# Patient Record
Sex: MALE | Race: WHITE | NOT HISPANIC OR LATINO | Employment: OTHER | URBAN - METROPOLITAN AREA
[De-identification: names, ages, dates, MRNs, and addresses within clinical notes are randomized per-mention and may not be internally consistent; named-entity substitution may affect disease eponyms.]

---

## 2017-06-22 PROBLEM — E78.5 HYPERLIPIDEMIA: Status: ACTIVE | Noted: 2017-06-22

## 2018-05-07 ENCOUNTER — OFFICE VISIT (OUTPATIENT)
Dept: MEDICAL GROUP | Facility: PHYSICIAN GROUP | Age: 63
End: 2018-05-07
Payer: COMMERCIAL

## 2018-05-07 VITALS
DIASTOLIC BLOOD PRESSURE: 62 MMHG | HEIGHT: 73 IN | RESPIRATION RATE: 19 BRPM | HEART RATE: 57 BPM | OXYGEN SATURATION: 96 % | WEIGHT: 226.6 LBS | BODY MASS INDEX: 30.03 KG/M2 | SYSTOLIC BLOOD PRESSURE: 100 MMHG | TEMPERATURE: 97.6 F

## 2018-05-07 DIAGNOSIS — E78.2 MIXED HYPERLIPIDEMIA: ICD-10-CM

## 2018-05-07 DIAGNOSIS — D68.59 HYPERCOAGULABLE STATE (HCC): ICD-10-CM

## 2018-05-07 DIAGNOSIS — G40.909 SEIZURE DISORDER (HCC): ICD-10-CM

## 2018-05-07 DIAGNOSIS — E66.3 OVERWEIGHT (BMI 25.0-29.9): ICD-10-CM

## 2018-05-07 DIAGNOSIS — I82.4Z2 DEEP VEIN THROMBOSIS (DVT) OF DISTAL VEIN OF LEFT LOWER EXTREMITY, UNSPECIFIED CHRONICITY (HCC): ICD-10-CM

## 2018-05-07 PROCEDURE — 99204 OFFICE O/P NEW MOD 45 MIN: CPT | Performed by: FAMILY MEDICINE

## 2018-05-07 RX ORDER — WARFARIN SODIUM 2.5 MG/1
2.5 TABLET ORAL DAILY
Qty: 24 TAB | Refills: 0 | Status: SHIPPED | OUTPATIENT
Start: 2018-05-07 | End: 2018-09-24

## 2018-05-07 RX ORDER — WARFARIN SODIUM 10 MG/1
10 TABLET ORAL DAILY
COMMUNITY
End: 2018-05-07 | Stop reason: SDUPTHER

## 2018-05-07 RX ORDER — WARFARIN SODIUM 2.5 MG/1
2.5 TABLET ORAL DAILY
COMMUNITY
End: 2018-05-07 | Stop reason: SDUPTHER

## 2018-05-07 RX ORDER — WARFARIN SODIUM 10 MG/1
10 TABLET ORAL DAILY
Qty: 90 TAB | Refills: 0 | Status: SHIPPED | OUTPATIENT
Start: 2018-05-07 | End: 2018-07-11 | Stop reason: SDUPTHER

## 2018-05-07 NOTE — PROGRESS NOTES
CC:  Hypercoagulability     HISTORY OF THE PRESENT ILLNESS: Patient is a 62 y.o. male. This pleasant patient is here today to establish care after his doctor retired.    Health Maintenance:       Deep vein thrombosis (DVT) of distal vein of left lower extremity (CMS-[facility])  He was diagnosed with DVT in his left leg in the setting of a pneumonia.  He was started on coumadin.  He has been on coumadin since as he was told he was hypercoagulable.      Seizure disorder (CMS-[facility])  He is currently following with Dr. Miguel.  He saw Dr. Mendez previous to that.  He is on phenytoin and phenobarbital.  He has not had a seizure for years.    Overweight (BMI 25.0-29.9)  He and his wife are losing weight together.  He has lost at least 15 pounds in the last 5 months.      Hyperlipidemia  His last cholesterol was elevated.  He has lost weight since this was done.  Tchol 100, , HDL 33.      Allergies: Codeine; Demerol; and Penicillins    Current Outpatient Prescriptions Ordered in [facility]   Medication Sig Dispense Refill   • warfarin (COUMADIN) 10 MG Tab Take 1 Tab by mouth every day. 90 Tab 0   • warfarin (COUMADIN) 2.5 MG Tab Take 1 Tab by mouth every day. Take 1 tab twice a week. 24 Tab 0   • phenytoin ER (DILANTIN) 100 MG Cap Take 100 mg by mouth 3 times a day. Pt takes 5 Caps QOD alternating with 6 Caps as Directed     • phenobarbital (LUMINAL) 64.8 MG tablet Take 64.8 mg by mouth 3 times a day.       No current [facility]-ordered facility-administered medications on file.        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     - HEENT: Negative for headaches, vision changes, hearing changes, ear pain, ear discharge, rhinorrhea, sinus congestion, sore throat, and neck pain.      - Respiratory: Negative for cough, sputum production, chest congestion, dyspnea, wheezing, and crackles.      - Cardiovascular: Negative for chest pain, palpitations, orthopnea, and bilateral lower extremity edema.     - Gastrointestinal: Negative for heartburn, nausea, vomiting, abdominal pain, hematochezia, melena, diarrhea, constipation, and greasy/foul-smelling stools.     - Genitourinary: Negative for dysuria, polyuria, hematuria, pyuria, urinary urgency, and urinary incontinence.    - Musculoskeletal: Negative for myalgias, back pain, and joint pain.     - Skin: Negative for rash, itching, cyanotic skin color change.     - Neurological: Negative for dizziness, tingling, tremors, focal sensory deficit, focal weakness and headaches.     - Endo/Heme/Allergies: Does not bruise/bleed easily.     - Psychiatric/Behavioral: Negative for depression, suicidal/homicidal ideation and memory loss.        Exam: Blood pressure 100/62, pulse (!) 57, temperature 36.4 °C (97.6 °F), resp. rate 19, height 1.854 m (6' 1\"), weight 102.8 kg (226 lb 9.6 oz), SpO2 96 %. Body mass index is 29.9 kg/m².    General: Normal appearing. No distress.  HEENT: Normocephalic. Eyes conjunctiva clear lids without ptosis, pupils equal and reactive to light accommodation, ears normal shape and contour, canals are clear bilaterally, tympanic membranes are benign, nasal mucosa benign, oropharynx is without erythema, edema or exudates.   Neck: Supple without JVD or bruit. Thyroid is not enlarged.  Pulmonary: Clear to ausculation.  " Normal effort. No rales, ronchi, or wheezing.  Cardiovascular: Regular rate and rhythm without murmur. Carotid and radial pulses are intact and equal bilaterally.  Abdomen: Soft, nontender, nondistended. Normal bowel sounds. Liver and spleen are not palpable  Neurologic: Grossly nonfocal  Lymph: No cervical, supraclavicular or axillary lymph nodes are palpable  Skin: Warm and dry.  No obvious lesions.  Musculoskeletal: Normal gait. No extremity cyanosis, clubbing, or edema.  Psych: Normal mood and affect. Alert and oriented x3. Judgment and insight is normal.    Please note that this dictation was created using voice recognition software. I have made every reasonable attempt to correct obvious errors, but I expect that there are errors of grammar and possibly content that I did not discover before finalizing the note.      Assessment/Plan  1. Deep vein thrombosis (DVT) of distal vein of left lower extremity, unspecified chronicity (HCC)  History of a DVT the left lower leg that occurred during a episode of pneumonia. He and his wife report that he had a hypercoagulability disorder and he has been on anticoagulation since. I do not have records to confirm which hypercoagulability disorder this is. We will continue anticoagulation for now and will request record.  He has been referred to the anticoagulation service.  - REFERRAL TO ANTICOAGULATION MONITORING  - warfarin (COUMADIN) 10 MG Tab; Take 1 Tab by mouth every day.  Dispense: 90 Tab; Refill: 0  - warfarin (COUMADIN) 2.5 MG Tab; Take 1 Tab by mouth every day. Take 1 tab twice a week.  Dispense: 24 Tab; Refill: 0    2. Seizure disorder (HCC)  He is well-controlled on his current antiepileptics. This is Dr. Miguel.    3. Overweight (BMI 25.0-29.9)  He has been losing weight quite successfully on a plan he is following with his wife. He is congratulated on his weight loss and for no longer being in the obesity range.    4. Hypercoagulable state (HCC)  - REFERRAL TO  ANTICOAGULATION MONITORING  - warfarin (COUMADIN) 10 MG Tab; Take 1 Tab by mouth every day.  Dispense: 90 Tab; Refill: 0  - warfarin (COUMADIN) 2.5 MG Tab; Take 1 Tab by mouth every day. Take 1 tab twice a week.  Dispense: 24 Tab; Refill: 0    5. Mixed hyperlipidemia  He does have an elevated lipid panel, his 10 year risk is about 10%. He has lost quite a bit of weight since this was done and prefers to remain off medications if possible. We will repeat his cholesterol and evaluate for improvement and reassess his risk and whether medications are indicated.  - LIPID PANEL

## 2018-05-07 NOTE — ASSESSMENT & PLAN NOTE
He and his wife are losing weight together.  He has lost at least 15 pounds in the last 5 months.

## 2018-05-07 NOTE — ASSESSMENT & PLAN NOTE
He is currently following with Dr. Miguel.  He saw Dr. Mendez previous to that.  He is on phenytoin and phenobarbital.  He has not had a seizure for years.

## 2018-05-07 NOTE — PATIENT INSTRUCTIONS
"High Cholesterol  High cholesterol is a condition in which the blood has high levels of a white, waxy, fat-like substance (cholesterol). The human body needs small amounts of cholesterol. The liver makes all the cholesterol that the body needs. Extra (excess) cholesterol comes from the food that we eat.  Cholesterol is carried from the liver by the blood through the blood vessels. If you have high cholesterol, deposits (plaques) may build up on the walls of your blood vessels (arteries). Plaques make the arteries narrower and stiffer. Cholesterol plaques increase your risk for heart attack and stroke. Work with your health care provider to keep your cholesterol levels in a healthy range.  What increases the risk?  This condition is more likely to develop in people who:  · Eat foods that are high in animal fat (saturated fat) or cholesterol.  · Are overweight.  · Are not getting enough exercise.  · Have a family history of high cholesterol.  What are the signs or symptoms?  There are no symptoms of this condition.  How is this diagnosed?  This condition may be diagnosed from the results of a blood test.  · If you are older than age 20, your health care provider may check your cholesterol every 4-6 years.  · You may be checked more often if you already have high cholesterol or other risk factors for heart disease.  The blood test for cholesterol measures:  · \"Bad\" cholesterol (LDL cholesterol). This is the main type of cholesterol that causes heart disease. The desired level for LDL is less than 100.  · \"Good\" cholesterol (HDL cholesterol). This type helps to protect against heart disease by cleaning the arteries and carrying the LDL away. The desired level for HDL is 60 or higher.  · Triglycerides. These are fats that the body can store or burn for energy. The desired number for triglycerides is lower than 150.  · Total cholesterol. This is a measure of the total amount of cholesterol in your blood, including LDL " cholesterol, HDL cholesterol, and triglycerides. A healthy number is less than 200.  How is this treated?  This condition is treated with diet changes, lifestyle changes, and medicines.  Diet changes  · This may include eating more whole grains, fruits, vegetables, nuts, and fish.  · This may also include cutting back on red meat and foods that have a lot of added sugar.  Lifestyle changes  · Changes may include getting at least 40 minutes of aerobic exercise 3 times a week. Aerobic exercises include walking, biking, and swimming. Aerobic exercise along with a healthy diet can help you maintain a healthy weight.  · Changes may also include quitting smoking.  Medicines  · Medicines are usually given if diet and lifestyle changes have failed to reduce your cholesterol to healthy levels.  · Your health care provider may prescribe a statin medicine. Statin medicines have been shown to reduce cholesterol, which can reduce the risk of heart disease.  Follow these instructions at home:  Eating and drinking  If told by your health care provider:  · Eat chicken (without skin), fish, veal, shellfish, ground turkey breast, and round or loin cuts of red meat.  · Do not eat fried foods or fatty meats, such as hot dogs and salami.  · Eat plenty of fruits, such as apples.  · Eat plenty of vegetables, such as broccoli, potatoes, and carrots.  · Eat beans, peas, and lentils.  · Eat grains such as barley, rice, couscous, and bulgur wheat.  · Eat pasta without cream sauces.  · Use skim or nonfat milk, and eat low-fat or nonfat yogurt and cheeses.  · Do not eat or drink whole milk, cream, ice cream, egg yolks, or hard cheeses.  · Do not eat stick margarine or tub margarines that contain trans fats (also called partially hydrogenated oils).  · Do not eat saturated tropical oils, such as coconut oil and palm oil.  · Do not eat cakes, cookies, crackers, or other baked goods that contain trans fats.  General instructions  · Exercise as  directed by your health care provider. Increase your activity level with activities such as gardening, walking, and taking the stairs.  · Take over-the-counter and prescription medicines only as told by your health care provider.  · Do not use any products that contain nicotine or tobacco, such as cigarettes and e-cigarettes. If you need help quitting, ask your health care provider.  · Keep all follow-up visits as told by your health care provider. This is important.  Contact a health care provider if:  · You are struggling to maintain a healthy diet or weight.  · You need help to start on an exercise program.  · You need help to stop smoking.  Get help right away if:  · You have chest pain.  · You have trouble breathing.  This information is not intended to replace advice given to you by your health care provider. Make sure you discuss any questions you have with your health care provider.  Document Released: 12/18/2006 Document Revised: 07/15/2017 Document Reviewed: 06/17/2017  [employer] Interactive Patient Education © 2017 [employer]

## 2018-05-07 NOTE — ASSESSMENT & PLAN NOTE
He was diagnosed with DVT in his left leg in the setting of a pneumonia.  He was started on coumadin.  He has been on coumadin since as he was told he was hypercoagulable.

## 2018-05-08 ENCOUNTER — TELEPHONE (OUTPATIENT)
Dept: VASCULAR LAB | Facility: MEDICAL CENTER | Age: 63
End: 2018-05-08

## 2018-05-08 NOTE — TELEPHONE ENCOUNTER
Left VM for pt regarding referral to anticoagulation. Asked pt to please call back to establish care.     Christie Gomez, JosselinD

## 2018-05-23 ENCOUNTER — ANTICOAGULATION VISIT (OUTPATIENT)
Dept: MEDICAL GROUP | Facility: PHYSICIAN GROUP | Age: 63
End: 2018-05-23
Payer: COMMERCIAL

## 2018-05-23 DIAGNOSIS — I82.5Z2 CHRONIC DEEP VEIN THROMBOSIS (DVT) OF DISTAL VEIN OF LEFT LOWER EXTREMITY (HCC): ICD-10-CM

## 2018-05-23 DIAGNOSIS — G40.909 SEIZURE DISORDER (HCC): ICD-10-CM

## 2018-05-23 DIAGNOSIS — D68.59 HYPERCOAGULABLE STATE (HCC): ICD-10-CM

## 2018-05-23 LAB — INR PPP: 3.1 (ref 2–3.5)

## 2018-05-23 PROCEDURE — 99211 OFF/OP EST MAY X REQ PHY/QHP: CPT | Performed by: FAMILY MEDICINE

## 2018-05-23 PROCEDURE — 85610 PROTHROMBIN TIME: CPT | Performed by: FAMILY MEDICINE

## 2018-06-06 ENCOUNTER — ANTICOAGULATION VISIT (OUTPATIENT)
Dept: MEDICAL GROUP | Facility: PHYSICIAN GROUP | Age: 63
End: 2018-06-06
Payer: COMMERCIAL

## 2018-06-06 VITALS — HEART RATE: 60 BPM | SYSTOLIC BLOOD PRESSURE: 90 MMHG | HEIGHT: 73 IN | DIASTOLIC BLOOD PRESSURE: 56 MMHG

## 2018-06-06 DIAGNOSIS — D68.59 HYPERCOAGULABLE STATE (HCC): ICD-10-CM

## 2018-06-06 DIAGNOSIS — I82.4Z2 ACUTE DEEP VEIN THROMBOSIS (DVT) OF DISTAL VEIN OF LEFT LOWER EXTREMITY (HCC): ICD-10-CM

## 2018-06-06 DIAGNOSIS — G40.909 SEIZURE DISORDER (HCC): ICD-10-CM

## 2018-06-06 LAB — INR PPP: 3.8 (ref 2–3.5)

## 2018-06-06 PROCEDURE — 85610 PROTHROMBIN TIME: CPT | Performed by: FAMILY MEDICINE

## 2018-06-06 PROCEDURE — 99211 OFF/OP EST MAY X REQ PHY/QHP: CPT | Performed by: FAMILY MEDICINE

## 2018-06-06 NOTE — PROGRESS NOTES
Anticoagulation Summary  As of 6/6/2018    INR goal:   2.0-3.0   TTR:   0.0 % (4 d)   Today's INR:   3.8!   Warfarin maintenance plan:   10 mg (10 mg x 1) every day   Weekly warfarin total:   70 mg   Plan last modified:   Patsy Collins PharmD (6/6/2018)   Next INR check:   6/20/2018   Target end date:   Indefinite    Indications    Deep vein thrombosis (DVT) of distal vein of left lower extremity (HCC) [I82.4Z2]  Hypercoagulable state (HCC) [D68.59]  Seizure disorder (HCC) [G40.909]             Anticoagulation Episode Summary     [facility] check location[facility]   [facility]    Preferred lab:       Send INR reminders to:       Comments:         Anticoagulation Care Providers     Provider Role Specialty Phone number    Shari Vargas M.D. Referring Family Medicine [phone]    [facility] Responsible  [phone]    Josselin IvoryD Responsible          Anticoagulation Patient Findings    History of Present Illness: follow up appointment for chronic anticoagulation with the high risk medication, warfarin for DVT.    Last INR was out of range, dosage adjusted: pt remains supra therapeutic trending upward.  Will reduce tonight's dose to 5mg and reduce weekly dose by 7%. Follow up in 2 weeks, to reduce risk of adverse events related to this high risk medication,  Warfarin.    Josselin IvoryD

## 2018-06-20 ENCOUNTER — ANTICOAGULATION VISIT (OUTPATIENT)
Dept: MEDICAL GROUP | Facility: PHYSICIAN GROUP | Age: 63
End: 2018-06-20
Payer: COMMERCIAL

## 2018-06-20 VITALS — HEART RATE: 60 BPM | HEIGHT: 73 IN | DIASTOLIC BLOOD PRESSURE: 60 MMHG | SYSTOLIC BLOOD PRESSURE: 104 MMHG

## 2018-06-20 DIAGNOSIS — D68.59 HYPERCOAGULABLE STATE (HCC): ICD-10-CM

## 2018-06-20 DIAGNOSIS — G40.909 SEIZURE DISORDER (HCC): ICD-10-CM

## 2018-06-20 LAB — INR PPP: 3.4 (ref 2–3.5)

## 2018-06-20 PROCEDURE — 85610 PROTHROMBIN TIME: CPT | Performed by: FAMILY MEDICINE

## 2018-06-20 PROCEDURE — 99211 OFF/OP EST MAY X REQ PHY/QHP: CPT | Performed by: FAMILY MEDICINE

## 2018-06-20 NOTE — PROGRESS NOTES
Anticoagulation Summary  As of 6/20/2018    INR goal:   2.0-3.0   TTR:   0.0 % (2.6 wk)   Today's INR:   3.4!   Warfarin maintenance plan:   5 mg (10 mg x 0.5) on Wed; 10 mg (10 mg x 1) all other days   Weekly warfarin total:   65 mg   Plan last modified:   Patsy Collins PharmD (6/20/2018)   Next INR check:   7/11/2018   Target end date:   Indefinite    Indications    Deep vein thrombosis (DVT) of distal vein of left lower extremity (HCC) [I82.4Z2]  Hypercoagulable state (HCC) [D68.59]  Seizure disorder (HCC) [G40.909]             Anticoagulation Episode Summary     [facility] check location[facility]   [facility]    Preferred lab:       Send INR reminders to:       Comments:         Anticoagulation Care Providers     Provider Role Specialty Phone number    Shari Vargas M.D. Referring Family Medicine [phone]    [facility] Anticoagulation Services Responsible  [phone]    Patsy Collins PharmD Responsible          Anticoagulation Patient Findings    History of Present Illness: follow up appointment for chronic anticoagulation with the high risk medication, warfarin for DVT.    Last INR was out of range, dosage adjusted: pt remains supra therapeutic today.  Will reduce weekly dose by 7%.  Follow up in 1 weeks, to reduce risk of adverse events related to this high risk medication,  Warfarin.    Patsy Collins PharmD      Pt declines vitals

## 2018-07-09 PROBLEM — R74.8 ELEVATED ALKALINE PHOSPHATASE LEVEL: Status: ACTIVE | Noted: 2018-07-09

## 2018-07-11 ENCOUNTER — ANTICOAGULATION VISIT (OUTPATIENT)
Dept: MEDICAL GROUP | Facility: PHYSICIAN GROUP | Age: 63
End: 2018-07-11
Payer: COMMERCIAL

## 2018-07-11 VITALS — SYSTOLIC BLOOD PRESSURE: 118 MMHG | HEART RATE: 60 BPM | HEIGHT: 73 IN | DIASTOLIC BLOOD PRESSURE: 62 MMHG

## 2018-07-11 DIAGNOSIS — I82.4Z2 DEEP VEIN THROMBOSIS (DVT) OF DISTAL VEIN OF LEFT LOWER EXTREMITY, UNSPECIFIED CHRONICITY (HCC): ICD-10-CM

## 2018-07-11 DIAGNOSIS — D68.59 HYPERCOAGULABLE STATE (HCC): ICD-10-CM

## 2018-07-11 DIAGNOSIS — G40.909 SEIZURE DISORDER (HCC): ICD-10-CM

## 2018-07-11 LAB — INR PPP: 2.7 (ref 2–3.5)

## 2018-07-11 PROCEDURE — 99211 OFF/OP EST MAY X REQ PHY/QHP: CPT | Performed by: FAMILY MEDICINE

## 2018-07-11 PROCEDURE — 85610 PROTHROMBIN TIME: CPT | Performed by: FAMILY MEDICINE

## 2018-07-11 RX ORDER — WARFARIN SODIUM 10 MG/1
10 TABLET ORAL DAILY
Qty: 90 TAB | Refills: 1 | Status: SHIPPED | OUTPATIENT
Start: 2018-07-11 | End: 2018-09-05

## 2018-07-11 NOTE — PROGRESS NOTES
Anticoagulation Summary  As of 7/11/2018    INR goal:   2.0-3.0   TTR:   22.9 % (1.3 mo)   Today's INR:   2.7   Warfarin maintenance plan:   5 mg (10 mg x 0.5) on Wed; 10 mg (10 mg x 1) all other days   Weekly warfarin total:   65 mg   Plan last modified:   Patsy Collins PharmD (6/20/2018)   Next INR check:   8/8/2018   Target end date:   Indefinite    Indications    Deep vein thrombosis (DVT) of distal vein of left lower extremity (HCC) [I82.4Z2]  Hypercoagulable state (HCC) [D68.59]  Seizure disorder (HCC) [G40.909]             Anticoagulation Episode Summary     [facility] check location:   [facility]    Preferred lab:       Send INR reminders to:       Comments:         Anticoagulation Care Providers     Provider Role Specialty Phone number    Shari Vargas M.D. Referring Family Medicine [phone]    [facility] Anticoagulation Services Responsible  [phone]    Josselin IvoryD Responsible          Anticoagulation Patient Findings    History of Present Illness: follow up appointment for chronic anticoagulation with the high risk medication, warfarin for DVT.    Last INR was out of range, dosage adjusted: pt is now at goal.  Will increase the interval to recheck INR.  Follow up in 4 weeks, to reduce risk of adverse events related to this high risk medication,  Warfarin.    Josselin IvoryD

## 2018-08-08 ENCOUNTER — ANTICOAGULATION VISIT (OUTPATIENT)
Dept: MEDICAL GROUP | Facility: PHYSICIAN GROUP | Age: 63
End: 2018-08-08
Payer: COMMERCIAL

## 2018-08-08 DIAGNOSIS — I82.5Z2 CHRONIC DEEP VEIN THROMBOSIS (DVT) OF DISTAL VEIN OF LEFT LOWER EXTREMITY (HCC): ICD-10-CM

## 2018-08-08 DIAGNOSIS — G40.909 SEIZURE DISORDER (HCC): ICD-10-CM

## 2018-08-08 DIAGNOSIS — D68.59 HYPERCOAGULABLE STATE (HCC): ICD-10-CM

## 2018-08-08 LAB — INR PPP: 4.3 (ref 2–3.5)

## 2018-08-08 PROCEDURE — 85610 PROTHROMBIN TIME: CPT | Performed by: FAMILY MEDICINE

## 2018-08-08 PROCEDURE — 99211 OFF/OP EST MAY X REQ PHY/QHP: CPT | Performed by: FAMILY MEDICINE

## 2018-08-08 NOTE — PROGRESS NOTES
Anticoagulation Summary  As of 8/8/2018    INR goal:   2.0-3.0   TTR:   21.2 % (2.2 mo)   Today's INR:   4.3!   Warfarin maintenance plan:   5 mg (10 mg x 0.5) on Wed; 10 mg (10 mg x 1) all other days   Weekly warfarin total:   65 mg   Plan last modified:   Patsy Collins PharmD (6/20/2018)   Next INR check:   9/5/2018   Target end date:   Indefinite    Indications    Deep vein thrombosis (DVT) of distal vein of left lower extremity (HCC) [I82.4Z2]  Hypercoagulable state (HCC) [D68.59]  Seizure disorder (HCC) [G40.909]             Anticoagulation Episode Summary     [facility]R check location:   [facility]    Preferred lab:       Send INR reminders to:       Comments:         Anticoagulation Care Providers     Provider Role Specialty Phone number    Shari Vargas M.D. Referring Family Medicine [phone]    [facility] Anticoagulation Services Responsible  [phone]    Patsy Collins PharmD Responsible          Anticoagulation Patient Findings    History of Present Illness: follow up appointment for chronic anticoagulation with the high risk medication, warfarin for DVT.    Last INR was at goal. Pt is now supra therapeutic today.  Will HOLD dose tonight, then resume current dosing regimen.   [place] decreased [place] Follow up in 4 weeks (pt request), to reduce risk of adverse events related to this high risk medication,  Warfarin.    Patsy Collins PharmD      Pt declines vitals today

## 2018-09-05 ENCOUNTER — ANTICOAGULATION VISIT (OUTPATIENT)
Dept: MEDICAL GROUP | Facility: PHYSICIAN GROUP | Age: 63
End: 2018-09-05
Payer: COMMERCIAL

## 2018-09-05 DIAGNOSIS — G40.909 SEIZURE DISORDER (HCC): ICD-10-CM

## 2018-09-05 DIAGNOSIS — I82.5Z2 CHRONIC DEEP VEIN THROMBOSIS (DVT) OF DISTAL VEIN OF LEFT LOWER EXTREMITY (HCC): ICD-10-CM

## 2018-09-05 DIAGNOSIS — D68.59 HYPERCOAGULABLE STATE (HCC): ICD-10-CM

## 2018-09-05 LAB — INR PPP: 2.9 (ref 2–3.5)

## 2018-09-05 PROCEDURE — 85610 PROTHROMBIN TIME: CPT | Performed by: FAMILY MEDICINE

## 2018-09-05 PROCEDURE — 99211 OFF/OP EST MAY X REQ PHY/QHP: CPT | Performed by: FAMILY MEDICINE

## 2018-09-05 RX ORDER — WARFARIN SODIUM 10 MG/1
TABLET ORAL
Qty: 90 TAB | Refills: 1 | Status: SHIPPED | OUTPATIENT
Start: 2018-09-05 | End: 2019-01-17 | Stop reason: SDUPTHER

## 2018-09-05 NOTE — PROGRESS NOTES
Anticoagulation Summary  As of 9/5/2018    INR goal:   2.0-3.0   TTR:   17.1 % (3.2 mo)   Today's INR:   2.9   Warfarin maintenance plan:   5 mg (10 mg x 0.5) on Wed; 10 mg (10 mg x 1) all other days   Weekly warfarin total:   65 mg   Plan last modified:   Patsy Collins PharmD (6/20/2018)   Next INR check:   10/17/2018   Target end date:   Indefinite    Indications    Deep vein thrombosis (DVT) of distal vein of left lower extremity (HCC) [I82.4Z2]  Hypercoagulable state (HCC) [D68.59]  Seizure disorder (HCC) [G40.909]             Anticoagulation Episode Summary     [facility] check location[facility]   [facility]    Preferred lab:       Send INR reminders to:       Comments:         Anticoagulation Care Providers     Provider Role Specialty Phone number    Shari Vargas M.D. Referring Family Medicine [phone]    [facility] Anticoagulation Services Responsible  [phone]    Patsy Collins PharmD Responsible          Anticoagulation Patient Findings    History of Present Illness: follow up appointment for chronic anticoagulation with the high risk medication, warfarin for DVT.    Last INR was out of range, dosage adjusted.  Pt is now at goal. Pt is to continue with current warfarin dosing regimen.  Follow up in 6 weeks, to reduce risk of adverse events related to this high risk medication,  Warfarin.    Patsy Collins PharmD      Pt declines vitals today

## 2018-09-24 ENCOUNTER — HOSPITAL ENCOUNTER (OUTPATIENT)
Dept: LAB | Facility: MEDICAL CENTER | Age: 63
End: 2018-09-24
Attending: FAMILY MEDICINE
Payer: COMMERCIAL

## 2018-09-24 ENCOUNTER — OFFICE VISIT (OUTPATIENT)
Dept: MEDICAL GROUP | Facility: PHYSICIAN GROUP | Age: 63
End: 2018-09-24
Payer: COMMERCIAL

## 2018-09-24 VITALS
BODY MASS INDEX: 28.59 KG/M2 | HEART RATE: 55 BPM | HEIGHT: 73 IN | DIASTOLIC BLOOD PRESSURE: 82 MMHG | TEMPERATURE: 97.4 F | RESPIRATION RATE: 18 BRPM | SYSTOLIC BLOOD PRESSURE: 112 MMHG | WEIGHT: 215.7 LBS | OXYGEN SATURATION: 96 %

## 2018-09-24 DIAGNOSIS — Z23 IMMUNIZATION DUE: ICD-10-CM

## 2018-09-24 DIAGNOSIS — F33.2 SEVERE EPISODE OF RECURRENT MAJOR DEPRESSIVE DISORDER, WITHOUT PSYCHOTIC FEATURES (HCC): ICD-10-CM

## 2018-09-24 DIAGNOSIS — T14.91XA SUICIDAL BEHAVIOR WITH ATTEMPTED SELF-INJURY (HCC): ICD-10-CM

## 2018-09-24 DIAGNOSIS — I10 ESSENTIAL HYPERTENSION: ICD-10-CM

## 2018-09-24 LAB — VIT B12 SERPL-MCNC: 310 PG/ML (ref 211–911)

## 2018-09-24 PROCEDURE — 82607 VITAMIN B-12: CPT

## 2018-09-24 PROCEDURE — 90471 IMMUNIZATION ADMIN: CPT | Performed by: FAMILY MEDICINE

## 2018-09-24 PROCEDURE — 36415 COLL VENOUS BLD VENIPUNCTURE: CPT

## 2018-09-24 PROCEDURE — 99213 OFFICE O/P EST LOW 20 MIN: CPT | Mod: 25 | Performed by: FAMILY MEDICINE

## 2018-09-24 PROCEDURE — 90686 IIV4 VACC NO PRSV 0.5 ML IM: CPT | Performed by: FAMILY MEDICINE

## 2018-09-24 RX ORDER — SERTRALINE HYDROCHLORIDE 25 MG/1
25 TABLET, FILM COATED ORAL DAILY
Refills: 0 | COMMUNITY
Start: 2018-09-20 | End: 2018-09-24

## 2018-09-24 RX ORDER — CLONIDINE HYDROCHLORIDE 0.1 MG/1
TABLET ORAL
Refills: 0 | COMMUNITY
Start: 2018-09-20

## 2018-09-24 ASSESSMENT — PATIENT HEALTH QUESTIONNAIRE - PHQ9
CLINICAL INTERPRETATION OF PHQ2 SCORE: 6
5. POOR APPETITE OR OVEREATING: 3 - NEARLY EVERY DAY
SUM OF ALL RESPONSES TO PHQ QUESTIONS 1-9: 24
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
## 2018-09-24 NOTE — LETTER
[facility]  Shari Vargas M.D.  [address]  [facility] [address]  Fax: [phone]   Authorization for Release/Disclosure of   Protected Health Information   Name: MAGDI SARAVIA : 1955 SSN: [id number]   Address: [facility] [address]  [facility] [address] Phone:    [phone] (home) [phone] (work)   I authorize the entity listed below to release/disclose the PHI below to:   [facility]/Shari Vargas M.D. and Shari Vargas M.D.   Provider or Entity Name:     Address   City, State, [facility]   Phone:      Fax:     Reason for request: continuity of care   Information to be released:    [  ] LAST COLONOSCOPY,  including any PATH REPORT and follow-up  [  ] LAST FIT/COLOGUARD RESULT [  ] LAST DEXA  [  ] LAST MAMMOGRAM  [  ] LAST PAP  [  ] LAST LABS [  ] RETINA EXAM REPORT  [  ] IMMUNIZATION RECORDS  [  ] Release all info      [  ] Check here and initial the line next to each item to release ALL health information INCLUDING  _____ Care and treatment for drug and / or alcohol abuse  _____ HIV testing, infection status, or AIDS  _____ Genetic Testing    DATES OF SERVICE OR TIME PERIOD TO BE DISCLOSED: _____________  I understand and acknowledge that:  * This Authorization may be revoked at any time by you in writing, except if your health information has already been used or disclosed.  * Your health information that will be used or disclosed as a result of you signing this authorization could be re-disclosed by the recipient. If this occurs, your re-disclosed health information may no longer be protected by State or Federal laws.  * You may refuse to sign this Authorization. Your refusal will not affect your ability to obtain treatment.  * This Authorization becomes effective upon signing and will  on (date) __________.      If no date is indicated, this Authorization will  one (1) year from the signature date.    Name: Magdi Saravia    Signature:   Date:          9/24/2018       PLEASE FAX REQUESTED RECORDS BACK TO: [phone]

## 2018-09-24 NOTE — PATIENT INSTRUCTIONS
Positive thoughts - gratitude journal  Exercise - an hour daily if possible  Read a chapter of Proverbs daily  Healthy diet
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
## 2018-09-24 NOTE — ASSESSMENT & PLAN NOTE
This is a new diagnosis but has been present for some time.  [facility] and has an appointment within the next 5 days.  He is also planning to attend a free weekly group session [facility].

## 2018-09-25 ENCOUNTER — TELEPHONE (OUTPATIENT)
Dept: MEDICAL GROUP | Facility: PHYSICIAN GROUP | Age: 63
End: 2018-09-25

## 2018-09-25 PROBLEM — E53.8 VITAMIN B12 DEFICIENCY: Status: ACTIVE | Noted: 2018-09-25

## 2018-09-25 NOTE — TELEPHONE ENCOUNTER
----- Message from Shari Vargas M.D. sent at 9/25/2018  1:22 PM PDT -----  Abnormal results include: low normal vitamin B12.  Please let him know his B12 is low normal and I would like him to take 1000 mcg of vitamin B12 daily until his next appointment.  THanks

## 2018-09-25 NOTE — TELEPHONE ENCOUNTER
Called and spoke with the patient informing him that his B12 was low and for him to take 1000 mcg until his next appointment. Patient expressed full understanding of what he needed to do.

## 2018-10-17 ENCOUNTER — OFFICE VISIT (OUTPATIENT)
Dept: MEDICAL GROUP | Facility: PHYSICIAN GROUP | Age: 63
End: 2018-10-17
Payer: COMMERCIAL

## 2018-10-17 ENCOUNTER — ANTICOAGULATION VISIT (OUTPATIENT)
Dept: MEDICAL GROUP | Facility: PHYSICIAN GROUP | Age: 63
End: 2018-10-17
Payer: COMMERCIAL

## 2018-10-17 VITALS
HEIGHT: 73 IN | RESPIRATION RATE: 14 BRPM | DIASTOLIC BLOOD PRESSURE: 60 MMHG | SYSTOLIC BLOOD PRESSURE: 110 MMHG | OXYGEN SATURATION: 98 % | TEMPERATURE: 98.5 F | HEART RATE: 68 BPM | WEIGHT: 221 LBS | BODY MASS INDEX: 29.29 KG/M2

## 2018-10-17 DIAGNOSIS — D68.59 HYPERCOAGULABLE STATE (HCC): ICD-10-CM

## 2018-10-17 DIAGNOSIS — I82.5Z2 CHRONIC DEEP VEIN THROMBOSIS (DVT) OF DISTAL VEIN OF LEFT LOWER EXTREMITY (HCC): ICD-10-CM

## 2018-10-17 DIAGNOSIS — R74.8 ELEVATED ALKALINE PHOSPHATASE LEVEL: ICD-10-CM

## 2018-10-17 DIAGNOSIS — E53.8 VITAMIN B12 DEFICIENCY: ICD-10-CM

## 2018-10-17 DIAGNOSIS — G40.909 SEIZURE DISORDER (HCC): ICD-10-CM

## 2018-10-17 DIAGNOSIS — E78.2 MIXED HYPERLIPIDEMIA: ICD-10-CM

## 2018-10-17 DIAGNOSIS — F43.10 PTSD (POST-TRAUMATIC STRESS DISORDER): ICD-10-CM

## 2018-10-17 DIAGNOSIS — F33.2 SEVERE EPISODE OF RECURRENT MAJOR DEPRESSIVE DISORDER, WITHOUT PSYCHOTIC FEATURES (HCC): ICD-10-CM

## 2018-10-17 PROBLEM — B19.20 HEPATITIS C: Status: ACTIVE | Noted: 2018-10-17

## 2018-10-17 PROBLEM — R76.8 HEPATITIS C ANTIBODY TEST POSITIVE: Status: ACTIVE | Noted: 2018-10-17

## 2018-10-17 PROBLEM — I10 ESSENTIAL HYPERTENSION: Status: RESOLVED | Noted: 2018-09-24 | Resolved: 2018-10-17

## 2018-10-17 LAB — INR PPP: 2.4 (ref 2–3.5)

## 2018-10-17 PROCEDURE — 85610 PROTHROMBIN TIME: CPT | Performed by: FAMILY MEDICINE

## 2018-10-17 PROCEDURE — 99214 OFFICE O/P EST MOD 30 MIN: CPT | Performed by: FAMILY MEDICINE

## 2018-10-17 RX ORDER — SERTRALINE HYDROCHLORIDE 25 MG/1
25 TABLET, FILM COATED ORAL DAILY
COMMUNITY
End: 2019-01-17

## 2018-10-17 NOTE — ASSESSMENT & PLAN NOTE
[facility] is [facility].  He continues to take Dilantin 100 mg three times a day.  He denies recent seizures, his last seizure was years ago.  He has follow up with DR. Miguel on 10/29/2018.

## 2018-10-17 NOTE — ASSESSMENT & PLAN NOTE
He has [facility], with Tracy Galdamez, he has follow up appointments almost weekly.  He is taking sertraline and clonidine and is feeling better.  He denies any suicidal thoughts and has contracted for safety with many people.

## 2018-10-17 NOTE — LETTER
[facility]  Shari Vargas M.D.  [address]  [facility] [address]  Fax: [phone]   Authorization for Release/Disclosure of   Protected Health Information   Name: MAGDI CARNEY : 1955 SSN: [id number]   Address: [address] [place] [address] [address]  [facility] [address] Phone:    [phone] (home) [phone] (work)   I authorize the entity listed below to release/disclose the PHI below to:   [facility]/Shari Vargas M.D. and Shari Vargas M.D.   Provider or Entity Name:  [facility]   Address   City, State, [facility]   Phone:      Fax:     Reason for request: continuity of care   Information to be released:    [  ] LAST COLONOSCOPY,  including any PATH REPORT and follow-up  [  ] LAST FIT/COLOGUARD RESULT [  ] LAST DEXA  [  ] LAST MAMMOGRAM  [  ] LAST PAP  [  ] LAST LABS [  ] RETINA EXAM REPORT  [  ] IMMUNIZATION RECORDS  [ X ] Release all info      [  ] Check here and initial the line next to each item to release ALL health information INCLUDING  _ X__ Care and treatment for drug and / or alcohol abuse  _____ HIV testing, infection status, or AIDS  _____ Genetic Testing    DATES OF SERVICE OR TIME PERIOD TO BE DISCLOSED: _____________  I understand and acknowledge that:  * This Authorization may be revoked at any time by you in writing, except if your health information has already been used or disclosed.  * Your health information that will be used or disclosed as a result of you signing this authorization could be re-disclosed by the recipient. If this occurs, your re-disclosed health information may no longer be protected by State or Federal laws.  * You may refuse to sign this Authorization. Your refusal will not affect your ability to obtain treatment.  * This Authorization becomes effective upon signing and will  on (date) __________.      If no date is indicated, this Authorization will  one (1) year from the signature date.    Name: Magdi  Jeanmarie Saravia    Signature:   Date:     10/17/2018       PLEASE FAX REQUESTED RECORDS BACK TO: [phone]

## 2018-10-17 NOTE — LETTER
[facility]  Shari Vargas M.D.  [address]  [facility] [address]  Fax: [phone]   Authorization for Release/Disclosure of   Protected Health Information   Name: MAGDI CARNEY : 1955 SSN: [id number]   Address: [address] [place] [address] [place]  [facility] [address] Phone:    [phone] (home) [phone] (work)   I authorize the entity listed below to release/disclose the PHI below to:   [facility]/Shari Vargas M.D. and Shari Vargas M.D.   Provider or Entity Name:  [facility]   Address   City, [facility], [facility]   Phone:      Fax:[phone]     Reason for request: continuity of care   Information to be released:    [ X ] LAST COLONOSCOPY,  including any PATH REPORT and follow-up  [  ] LAST FIT/COLOGUARD RESULT [  ] LAST DEXA  [  ] LAST MAMMOGRAM  [  ] LAST PAP  [  ] LAST LABS [  ] RETINA EXAM REPORT  [  ] IMMUNIZATION RECORDS  [  ] Release all info      [  ] Check here and initial the line next to each item to release ALL health information INCLUDING  _____ Care and treatment for drug and / or alcohol abuse  _____ HIV testing, infection status, or AIDS  _____ Genetic Testing    DATES OF SERVICE OR TIME PERIOD TO BE DISCLOSED: _____________  I understand and acknowledge that:  * This Authorization may be revoked at any time by you in writing, except if your health information has already been used or disclosed.  * Your health information that will be used or disclosed as a result of you signing this authorization could be re-disclosed by the recipient. If this occurs, your re-disclosed health information may no longer be protected by State or Federal laws.  * You may refuse to sign this Authorization. Your refusal will not affect your ability to obtain treatment.  * This Authorization becomes effective upon signing and will  on (date) __________.      If no date is indicated, this Authorization will  one (1) year from the signature date.    Name: Magdi Murry  Manjit    Signature:   Date:     10/17/2018       PLEASE FAX REQUESTED RECORDS BACK TO: [phone]

## 2018-10-17 NOTE — PATIENT INSTRUCTIONS
"Your 10 year risk is ~ 13.6%    We'll follow up in 3 months, have your labs done prior to your visit.    High Cholesterol  High cholesterol is a condition in which the blood has high levels of a white, waxy, fat-like substance (cholesterol). The human body needs small amounts of cholesterol. The liver makes all the cholesterol that the body needs. Extra (excess) cholesterol comes from the food that we eat.  Cholesterol is carried from the liver by the blood through the blood vessels. If you have high cholesterol, deposits (plaques) may build up on the walls of your blood vessels (arteries). Plaques make the arteries narrower and stiffer. Cholesterol plaques increase your risk for heart attack and stroke. Work with your health care provider to keep your cholesterol levels in a healthy range.  What increases the risk?  This condition is more likely to develop in people who:  · Eat foods that are high in animal fat (saturated fat) or cholesterol.  · Are overweight.  · Are not getting enough exercise.  · Have a family history of high cholesterol.  What are the signs or symptoms?  There are no symptoms of this condition.  How is this diagnosed?  This condition may be diagnosed from the results of a blood test.  · If you are older than age 20, your health care provider may check your cholesterol every 4-6 years.  · You may be checked more often if you already have high cholesterol or other risk factors for heart disease.  The blood test for cholesterol measures:  · \"Bad\" cholesterol (LDL cholesterol). This is the main type of cholesterol that causes heart disease. The desired level for LDL is less than 100.  · \"Good\" cholesterol (HDL cholesterol). This type helps to protect against heart disease by cleaning the arteries and carrying the LDL away. The desired level for HDL is 60 or higher.  · Triglycerides. These are fats that the body can store or burn for energy. The desired number for triglycerides is lower than " 150.  · Total cholesterol. This is a measure of the total amount of cholesterol in your blood, including LDL cholesterol, HDL cholesterol, and triglycerides. A healthy number is less than 200.  How is this treated?  This condition is treated with diet changes, lifestyle changes, and medicines.  Diet changes  · This may include eating more whole grains, fruits, vegetables, nuts, and fish.  · This may also include cutting back on red meat and foods that have a lot of added sugar.  Lifestyle changes  · Changes may include getting at least 40 minutes of aerobic exercise 3 times a week. Aerobic exercises include walking, biking, and swimming. Aerobic exercise along with a healthy diet can help you maintain a healthy weight.  · Changes may also include quitting smoking.  Medicines  · Medicines are usually given if diet and lifestyle changes have failed to reduce your cholesterol to healthy levels.  · Your health care provider may prescribe a statin medicine. Statin medicines have been shown to reduce cholesterol, which can reduce the risk of heart disease.  Follow these instructions at home:  Eating and drinking  If told by your health care provider:  · Eat chicken (without skin), fish, veal, shellfish, ground turkey breast, and round or loin cuts of red meat.  · Do not eat fried foods or fatty meats, such as hot dogs and salami.  · Eat plenty of fruits, such as apples.  · Eat plenty of vegetables, such as broccoli, potatoes, and carrots.  · Eat beans, peas, and lentils.  · Eat grains such as barley, rice, couscous, and bulgur wheat.  · Eat pasta without cream sauces.  · Use skim or nonfat milk, and eat low-fat or nonfat yogurt and cheeses.  · Do not eat or drink whole milk, cream, ice cream, egg yolks, or hard cheeses.  · Do not eat stick margarine or tub margarines that contain trans fats (also called partially hydrogenated oils).  · Do not eat saturated tropical oils, such as coconut oil and palm oil.  · Do not eat  cakes, cookies, crackers, or other baked goods that contain trans fats.  General instructions  · Exercise as directed by your health care provider. Increase your activity level with activities such as gardening, walking, and taking the stairs.  · Take over-the-counter and prescription medicines only as told by your health care provider.  · Do not use any products that contain nicotine or tobacco, such as cigarettes and e-cigarettes. If you need help quitting, ask your health care provider.  · Keep all follow-up visits as told by your health care provider. This is important.  Contact a health care provider if:  · You are struggling to maintain a healthy diet or weight.  · You need help to start on an exercise program.  · You need help to stop smoking.  Get help right away if:  · You have chest pain.  · You have trouble breathing.  This information is not intended to replace advice given to you by your health care provider. Make sure you discuss any questions you have with your health care provider.  Document Released: 12/18/2006 Document Revised: 07/15/2017 Document Reviewed: 06/17/2017  [employer] Interactive Patient Education © 2017 [employer]      Heart-Healthy Eating Plan  Many factors influence your heart health, including eating and exercise habits. Heart (coronary) risk increases with abnormal blood fat (lipid) levels. Heart-healthy meal planning includes limiting unhealthy fats, increasing healthy fats, and making other small dietary changes. This includes maintaining a healthy body weight to help keep lipid levels within a normal range.  What is my plan?  Your health care provider recommends that you:  · Get no more than _________% of the total calories in your daily diet from fat.  · Limit your intake of saturated fat to less than _________% of your total calories each day.  · Limit the amount of cholesterol in your diet to less than _________ mg per day.  What types of fat should I choose?  · Choose  "healthy fats more often. Choose monounsaturated and polyunsaturated fats, such as olive oil and canola oil, flaxseeds, walnuts, almonds, and seeds.  · Eat more omega-3 fats. Good choices include salmon, mackerel, sardines, tuna, flaxseed oil, and ground flaxseeds. Aim to eat fish at least two times each week.  · Limit saturated fats. Saturated fats are primarily found in animal products, such as meats, butter, and cream. Plant sources of saturated fats include palm oil, palm kernel oil, and coconut oil.  · Avoid foods with partially hydrogenated oils in them. These contain trans fats. Examples of foods that contain trans fats are stick margarine, some tub margarines, cookies, crackers, and other baked goods.  What general guidelines do I need to follow?  · Check food labels carefully to identify foods with trans fats or high amounts of saturated fat.  · Fill one half of your plate with vegetables and green salads. Eat 4-5 servings of vegetables per day. A serving of vegetables equals 1 cup of raw leafy vegetables, ½ cup of raw or cooked cut-up vegetables, or ½ cup of vegetable juice.  · Fill one fourth of your plate with whole grains. Look for the word \"whole\" as the first word in the ingredient list.  · Fill one fourth of your plate with lean protein foods.  · Eat 4-5 servings of fruit per day. A serving of fruit equals one medium whole fruit, ¼ cup of dried fruit, ½ cup of fresh, frozen, or canned fruit, or ½ cup of 100% fruit juice.  · Eat more foods that contain soluble fiber. Examples of foods that contain this type of fiber are apples, broccoli, carrots, beans, peas, and barley. Aim to get 20-30 g of fiber per day.  · Eat more home-cooked food and less restaurant, buffet, and fast food.  · Limit or avoid alcohol.  · Limit foods that are high in starch and sugar.  · Avoid fried foods.  · Cook foods by using methods other than frying. Baking, boiling, grilling, and broiling are all great options. Other " fat-reducing suggestions include:  ¨ Removing the skin from poultry.  ¨ Removing all visible fats from meats.  ¨ Skimming the fat off of stews, soups, and gravies before serving them.  ¨ Steaming vegetables in water or broth.  · Lose weight if you are overweight. Losing just 5-10% of your initial body weight can help your overall health and prevent diseases such as diabetes and heart disease.  · Increase your consumption of nuts, legumes, and seeds to 4-5 servings per week. One serving of dried beans or legumes equals ½ cup after being cooked, one serving of nuts equals 1½ ounces, and one serving of seeds equals ½ ounce or 1 tablespoon.  · You may need to monitor your salt (sodium) intake, especially if you have high blood pressure. Talk with your health care provider or dietitian to get more information about reducing sodium.  What foods can I eat?  Grains   Breads, including Citizen of [place], white, alissa, wheat, raisin, rye, oatmeal, and Italian. Tortillas that are neither fried nor made with lard or trans fat. Low-fat rolls, including hotdog and hamburger buns and English muffins. Biscuits. Muffins. Waffles. Pancakes. Light popcorn. Whole-grain cereals. Flatbread. [place] toast. Pretzels. Breadsticks. Rusks. Low-fat snacks and crackers, including oyster, saltine, matzo, cesar, animal, and rye. Rice and pasta, including brown rice and those that are made with whole wheat.  Vegetables   All vegetables.  Fruits   All fruits, but limit coconut.  Meats and Other Protein Sources   Lean, well-trimmed beef, veal, pork, and lamb. Chicken and turkey without skin. All fish and shellfish. Wild duck, rabbit, pheasant, and venison. Egg whites or low-cholesterol egg substitutes. Dried beans, peas, lentils, and tofu. Seeds and most nuts.  Dairy   Low-fat or nonfat cheeses, including ricotta, string, and mozzarella. Skim or 1% milk that is liquid, powdered, or evaporated. Buttermilk that is made with low-fat milk. Nonfat or low-fat  yogurt.  Beverages   Mineral water. Diet carbonated beverages.  Sweets and Desserts   Sherbets and fruit ices. Honey, jam, marmalade, jelly, and syrups. Meringues and gelatins. Pure sugar candy, such as hard candy, jelly beans, gumdrops, mints, marshmallows, and small amounts of dark chocolate. Onur food cake.  Eat all sweets and desserts in moderation.  Fats and Oils   Nonhydrogenated (trans-free) margarines. Vegetable oils, including soybean, sesame, sunflower, olive, peanut, safflower, corn, canola, and cottonseed. Salad dressings or mayonnaise that are made with a vegetable oil. Limit added fats and oils that you use for cooking, baking, salads, and as spreads.  Other   Cocoa powder. Coffee and tea. All seasonings and condiments.  The items listed above may not be a complete list of recommended foods or beverages. Contact your dietitian for more options.   What foods are not recommended?  Grains   Breads that are made with saturated or trans fats, oils, or whole milk. Croissants. Butter rolls. Cheese breads. Sweet rolls. Donuts. Buttered popcorn. Chow mein noodles. High-fat crackers, such as cheese or butter crackers.  Meats and Other Protein Sources   Fatty meats, such as hotdogs, short ribs, sausage, spareribs, crespo, ribeye roast or steak, and mutton. High-fat deli meats, such as salami and bologna. Caviar. Domestic duck and goose. Organ meats, such as kidney, liver, sweetbreads, brains, gizzard, chitterlings, and heart.  Dairy   Cream, sour cream, cream cheese, and creamed cottage cheese. Whole milk cheeses, including blue (justen), [place] Foster, Brie, Holland, American, Havarti, Swiss, cheddar, Camembert, and [place]. Whole or 2% milk that is liquid, evaporated, or condensed. Whole buttermilk. Cream sauce or high-fat cheese sauce. Yogurt that is made from whole milk.  Beverages   Regular sodas and drinks with added sugar.  Sweets and Desserts   Frosting. Pudding. Cookies. Cakes other than onur food cake.  Candy that has milk chocolate or white chocolate, hydrogenated fat, butter, coconut, or unknown ingredients. Buttered syrups. Full-fat ice cream or ice cream drinks.  Fats and Oils   Gravy that has suet, meat fat, or shortening. Cocoa butter, hydrogenated oils, palm oil, coconut oil, palm kernel oil. These can often be found in baked products, candy, fried foods, nondairy creamers, and whipped toppings. Solid fats and shortenings, including crespo fat, salt pork, lard, and butter. Nondairy cream substitutes, such as coffee creamers and sour cream substitutes. Salad dressings that are made of unknown oils, cheese, or sour cream.  The items listed above may not be a complete list of foods and beverages to avoid. Contact your dietitian for more information.   This information is not intended to replace advice given to you by your health care provider. Make sure you discuss any questions you have with your health care provider.  Document Released: 09/26/2009 Document Revised: 07/07/2017 Document Reviewed: 06/11/2015  [employer] Interactive Patient Education © 2017 [employer]

## 2018-10-17 NOTE — LETTER
[facility]  Shari Vargas M.D.  [address]  [facility] [address]  Fax: [phone]   Authorization for Release/Disclosure of   Protected Health Information   Name: MAGDI SARAVIA : 1955 SSN: [id number]   Address: [facility] [address]  [facility] [address] Phone:    [phone] (home) [phone] (work)   I authorize the entity listed below to release/disclose the PHI below to:   [facility]/Shari Vargas M.D. and Shari Vargas M.D.   Provider or Entity Name:     Address   City, State, [facility]   Phone:      Fax:     Reason for request: continuity of care   Information to be released:    [  ] LAST COLONOSCOPY,  including any PATH REPORT and follow-up  [  ] LAST FIT/COLOGUARD RESULT [  ] LAST DEXA  [  ] LAST MAMMOGRAM  [  ] LAST PAP  [  ] LAST LABS [  ] RETINA EXAM REPORT  [  ] IMMUNIZATION RECORDS  [  ] Release all info      [  ] Check here and initial the line next to each item to release ALL health information INCLUDING  _____ Care and treatment for drug and / or alcohol abuse  _____ HIV testing, infection status, or AIDS  _____ Genetic Testing    DATES OF SERVICE OR TIME PERIOD TO BE DISCLOSED: _____________  I understand and acknowledge that:  * This Authorization may be revoked at any time by you in writing, except if your health information has already been used or disclosed.  * Your health information that will be used or disclosed as a result of you signing this authorization could be re-disclosed by the recipient. If this occurs, your re-disclosed health information may no longer be protected by State or Federal laws.  * You may refuse to sign this Authorization. Your refusal will not affect your ability to obtain treatment.  * This Authorization becomes effective upon signing and will  on (date) __________.      If no date is indicated, this Authorization will  one (1) year from the signature date.    Name: Magdi Saravia    Signature:   Date:          10/17/2018       PLEASE FAX REQUESTED RECORDS BACK TO: [phone]

## 2018-10-17 NOTE — PROGRESS NOTES
CC: depression, anger    HISTORY OF PRESENT ILLNESS: Patient is a 63 y.o. male established patient who presents today to follow up chronic conditions    Health Maintenance: Completed Colonoscopy done with [facility], thinks he's due next year, records requested    Severe episode of recurrent major depressive disorder, without psychotic features (HCC)  [facility] Tracy Galdamez, he has follow up appointments almost weekly.  He is taking sertraline and clonidine and is feeling better.  He denies any suicidal thoughts and has contracted for safety with many people.    Seizure disorder (CMS-HCC)  [facility] is [facility].  He continues to take Dilantin 100 mg three times a day.  He denies recent seizures, his last seizure was years ago.  He has follow up with DR. Miguel on 10/29/2018.    Vitamin B12 deficiency  His B12 was 310 in 9/2018.  He is taking B12.    PTSD (post-traumatic stress disorder)  He served in the  in the past.  He was electrocuted and has epilepsy which are thought to be to the traumas.  He was never in active combat.  He is [facility].  He reports avoiding situations and anger/anxiety.      Elevated alkaline phosphatase level  Level was 119, normal up to 117, on 6/27/2018.  He has [facility] further [facility] at the [facility].    Hyperlipidemia  Lab Results   Component Value Date/Time    CHOLSTRLTOT 200 (H) 01/08/2018 07:39 AM    CHOLSTRLTOT 207 (H) 12/30/2016 07:26 AM     (H) 01/08/2018 07:39 AM     (H) 12/30/2016 07:26 AM    HDL 33 (L) 01/08/2018 07:39 AM    HDL 31 (L) 12/30/2016 07:26 AM    TRIGLYCERIDE 210 (H) 01/08/2018 07:39 AM    TRIGLYCERIDE 221 (H) 12/30/2016 07:26 AM     He is not on a statin medication.  His 10 year risk is ~ 13.6%.      Patient Active Problem List    Diagnosis Date Noted   • Hepatitis C antibody test positive 10/17/2018   • Vitamin B12 deficiency 09/25/2018   • PTSD (post-traumatic  stress disorder) 09/24/2018   • Severe episode of recurrent major depressive disorder, without psychotic features ([facility]) 09/24/2018   • Elevated alkaline phosphatase level 07/09/2018   • Hyperlipidemia 06/22/2017   • Seizure disorder ([facility]) 12/22/2016   • Deep vein thrombosis (DVT) of distal vein of left lower extremity ([facility]) 12/22/2016   • Overweight (BMI 25.0-29.9) 12/22/2016   • Hypercoagulable state ([facility]) 12/22/2016      Allergies:Codeine; Demerol; Penicillins; and Wasp venom    Current Outpatient Prescriptions   Medication Sig Dispense Refill   • sertraline (ZOLOFT) 25 MG tablet Take 25 mg by mouth every day.     • cloNIDine (CATAPRES) 0.1 MG Tab TAKE 1 TAB BY MOUTH 3 TIMES A DAY AS NEEDED FOR ANGER/ANXIETY  0   • warfarin (COUMADIN) 10 MG Tab Take one tablet daily as directed by  [facility] (Patient taking differently: Take 10 mg by mouth every day. Take one tablet daily as directed by  [facility]) 90 Tab 1   • phenytoin ER (DILANTIN) 100 MG Cap Take 100 mg by mouth 3 times a day. Pt takes 5 Caps QOD alternating with 6 Caps as Directed     • phenobarbital (LUMINAL) 64.8 MG tablet Take 64.8 mg by mouth 3 times a day.       No current facility-administered medications for this visit.        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     - Respiratory: Negative for cough, sputum production, chest congestion, dyspnea, wheezing, and crackles.      - Cardiovascular: Negative for chest pain,  "palpitations, orthopnea, and bilateral lower extremity edema.     - Gastrointestinal: Negative for heartburn, nausea, vomiting, abdominal pain, hematochezia, melena, diarrhea, constipation, and greasy/foul-smelling stools.     - Psychiatric/Behavioral: Negative for delusion, suicidal/homicidal ideation and memory loss.      Exam:    Blood pressure 110/60, pulse 68, temperature 36.9 °C (98.5 °F), resp. rate 14, height 1.854 m (6' 1\"), weight 100.2 kg (221 lb), SpO2 98 %. Body mass index is 29.16 kg/m².    General:  Well nourished, well developed male in NAD  Head is grossly normal.  Neck: Supple without JVD or bruit. Thyroid is not enlarged.  Pulmonary: Clear to ausculation and percussion.  Normal effort. No rales, ronchi, or wheezing.  Cardiovascular: Regular rate and rhythm without murmur. Carotid and radial pulses are intact and equal bilaterally.  Abdomen: not tender, not distended, no organomegaly appreciated  Extremities: no clubbing, cyanosis, or edema.  Psych:    Please note that this dictation was created using voice recognition software. I have made every reasonable attempt to correct obvious errors, but I expect that there are errors of grammar and possibly content that I did not discover before finalizing the note.    Assessment/Plan:  1. Severe episode of recurrent major depressive disorder, without psychotic features (HCC)  Symptoms are gradually improving and he has better control over his anger and anxiety. He denies recurrent suicidal attempts or plans or psychosis.  He is tolerating his current medications well.  [facility].  He will follow up if he needs assistance earlier than in 3 months.    2. Seizure disorder (HCC)  Continue management with Dr. Miguel. This appears to be stable.    3. Vitamin B12 deficiency  He is on oral replacement therapy.  We will repeat labs in 3 months and determine if injections are needed.  - VITAMIN B12; Future    4. PTSD " (post-traumatic stress disorder)  Continue clonidine and sertraline, psychology with the [facility] as scheduled.    5. Mixed hyperlipidemia  his 10 year cardiovascular risk is elevated.  The recommendations regarding treatment of elevated cholesterol were reviewed.  he does meet criteria for treatment at this time with aspirin and a statin.  The benefits and risks of statin therapy were discussed including decreasing risk by ~ 30% and increased risk of myalgia and to a lesser degree diabetes and dementia.  Lifestyle interventions including exercise and a diet low in trans and saturated fat and added cholesterol were reviewed.  We will hold aspirin as he is anticoagulated and repeat cholesterol after a trial of lifestyle interventions at his request. Consider statin at his next visit if there is no improvement.    - LIPID PROFILE; Future  - COMP METABOLIC PANEL; Future    6. Elevated alkaline phosphatase level  This was minimal and it appears he has had [facility] the [facility].  We will request [facility] records and order repeat liver enzymes and isoenzyme of Alk phos.  He reports he was diagnosed with hep C but told it was not active.  We will review records to confirm.  - COMP METABOLIC PANEL; Future  - ALKALINE PHOSPHATASE ISOENZYMES; Future

## 2018-10-17 NOTE — PROGRESS NOTES
Anticoagulation Summary  As of 10/17/2018    INR goal:   2.0-3.0   TTR:   42.4 % (4.6 mo)   Today's INR:   2.4   Warfarin maintenance plan:   5 mg (10 mg x 0.5) on Wed; 10 mg (10 mg x 1) all other days   Weekly warfarin total:   65 mg   Plan last modified:   Patsy Collins PharmD (6/20/2018)   Next INR check:   11/28/2018   Target end date:   Indefinite    Indications    Deep vein thrombosis (DVT) of distal vein of left lower extremity (HCC) [I82.4Z2]  Hypercoagulable state (HCC) [D68.59]  Seizure disorder (HCC) [G40.909]             Anticoagulation Episode Summary     INR check location:   [facility]    Preferred lab:       Send INR reminders to:       Comments:         Anticoagulation Care Providers     Provider Role Specialty Phone number    Shari Vargas M.D. Referring Family Medicine [phone]    [facility] Responsible  [phone]    Josselin IvoryD Responsible          Anticoagulation Patient Findings  Patient Findings     Negatives:   Signs/symptoms of thrombosis, Signs/symptoms of bleeding, Laboratory test error suspected, Change in health, Change in alcohol use, Change in activity, Upcoming invasive procedure, Emergency department visit, Upcoming dental procedure, Missed doses, Extra doses, Change in medications, Change in diet/appetite, Hospital admission, Bruising, Other complaints        History of Present Illness: follow up appointment for chronic anticoagulation with the high risk medication, warfarin for DVT  Pt remains therapeutic today. Continue current dosing regimen.  Follow up in 6 weeks, to reduce the risk of adverse events related to this high risk medication, warfarin.    Patsy Collins, Clinical Pharmacist

## 2018-10-17 NOTE — ASSESSMENT & PLAN NOTE
Lab Results   Component Value Date/Time    CHOLSTRLTOT 200 (H) 01/08/2018 07:39 AM    CHOLSTRLTOT 207 (H) 12/30/2016 07:26 AM     (H) 01/08/2018 07:39 AM     (H) 12/30/2016 07:26 AM    HDL 33 (L) 01/08/2018 07:39 AM    HDL 31 (L) 12/30/2016 07:26 AM    TRIGLYCERIDE 210 (H) 01/08/2018 07:39 AM    TRIGLYCERIDE 221 (H) 12/30/2016 07:26 AM     He is not on a statin medication.  His 10 year risk is ~ 13.6%.

## 2018-11-28 ENCOUNTER — ANTICOAGULATION VISIT (OUTPATIENT)
Dept: MEDICAL GROUP | Facility: PHYSICIAN GROUP | Age: 63
End: 2018-11-28
Payer: COMMERCIAL

## 2018-11-28 DIAGNOSIS — D68.59 HYPERCOAGULABLE STATE (HCC): ICD-10-CM

## 2018-11-28 DIAGNOSIS — G40.909 SEIZURE DISORDER (HCC): ICD-10-CM

## 2018-11-28 LAB — INR PPP: 2.7 (ref 2–3.5)

## 2018-11-28 PROCEDURE — 99211 OFF/OP EST MAY X REQ PHY/QHP: CPT | Performed by: FAMILY MEDICINE

## 2018-11-28 PROCEDURE — 85610 PROTHROMBIN TIME: CPT | Performed by: FAMILY MEDICINE

## 2018-11-29 NOTE — PROGRESS NOTES
Anticoagulation Summary  As of 11/28/2018    INR goal:   2.0-3.0   TTR:   56.0 % (6 mo)   Today's INR:   2.7   Warfarin maintenance plan:   5 mg (10 mg x 0.5) on Wed; 10 mg (10 mg x 1) all other days   Weekly warfarin total:   65 mg   Plan last modified:   Patsy Collins PharmD (6/20/2018)   Next INR check:   1/30/2019   Target end date:   Indefinite    Indications    Deep vein thrombosis (DVT) of distal vein of left lower extremity (HCC) [I82.4Z2]  Hypercoagulable state (HCC) [D68.59]  Seizure disorder (HCC) [G40.909]             Anticoagulation Episode Summary     [facility] check location:   [facility]    Preferred lab:       Send INR reminders to:       Comments:         Anticoagulation Care Providers     Provider Role Specialty Phone number    Shari Vargas M.D. Referring Family Medicine [phone]    [facility] Anticoagulation Services Responsible  [phone]    Josselin IvoryD Responsible          Anticoagulation Patient Findings    History of Present Illness: follow up appointment for chronic anticoagulation with the high risk medication, warfarin for DVT    Pt remains therapeutic today. Continue current dosing regimen.  Follow up in 8 weeks, to reduce the risk of adverse events related to this high risk medication, warfarin.    Patsy Collins Clinical Pharmacist    Pt declines vitals today

## 2019-01-16 ENCOUNTER — HOSPITAL ENCOUNTER (OUTPATIENT)
Dept: LAB | Facility: MEDICAL CENTER | Age: 64
End: 2019-01-16
Attending: FAMILY MEDICINE
Payer: COMMERCIAL

## 2019-01-16 DIAGNOSIS — R74.8 ELEVATED ALKALINE PHOSPHATASE LEVEL: ICD-10-CM

## 2019-01-16 DIAGNOSIS — E53.8 VITAMIN B12 DEFICIENCY: ICD-10-CM

## 2019-01-16 DIAGNOSIS — E78.2 MIXED HYPERLIPIDEMIA: ICD-10-CM

## 2019-01-16 LAB
ALBUMIN SERPL BCP-MCNC: 4.2 G/DL (ref 3.2–4.9)
ALBUMIN/GLOB SERPL: 1.4 G/DL
ALP SERPL-CCNC: 92 U/L (ref 30–99)
ALT SERPL-CCNC: 17 U/L (ref 2–50)
ANION GAP SERPL CALC-SCNC: 8 MMOL/L (ref 0–11.9)
AST SERPL-CCNC: 14 U/L (ref 12–45)
BILIRUB SERPL-MCNC: 0.3 MG/DL (ref 0.1–1.5)
BUN SERPL-MCNC: 18 MG/DL (ref 8–22)
CALCIUM SERPL-MCNC: 9.2 MG/DL (ref 8.5–10.5)
CHLORIDE SERPL-SCNC: 108 MMOL/L (ref 96–112)
CHOLEST SERPL-MCNC: 198 MG/DL (ref 100–199)
CO2 SERPL-SCNC: 26 MMOL/L (ref 20–33)
CREAT SERPL-MCNC: 0.83 MG/DL (ref 0.5–1.4)
FASTING STATUS PATIENT QL REPORTED: NORMAL
GLOBULIN SER CALC-MCNC: 3 G/DL (ref 1.9–3.5)
GLUCOSE SERPL-MCNC: 84 MG/DL (ref 65–99)
HDLC SERPL-MCNC: 35 MG/DL
LDLC SERPL CALC-MCNC: 113 MG/DL
POTASSIUM SERPL-SCNC: 4.1 MMOL/L (ref 3.6–5.5)
PROT SERPL-MCNC: 7.2 G/DL (ref 6–8.2)
SODIUM SERPL-SCNC: 142 MMOL/L (ref 135–145)
TRIGL SERPL-MCNC: 251 MG/DL (ref 0–149)
VIT B12 SERPL-MCNC: 605 PG/ML (ref 211–911)

## 2019-01-16 PROCEDURE — 80053 COMPREHEN METABOLIC PANEL: CPT

## 2019-01-16 PROCEDURE — 82607 VITAMIN B-12: CPT

## 2019-01-16 PROCEDURE — 84075 ASSAY ALKALINE PHOSPHATASE: CPT

## 2019-01-16 PROCEDURE — 36415 COLL VENOUS BLD VENIPUNCTURE: CPT

## 2019-01-16 PROCEDURE — 84080 ASSAY ALKALINE PHOSPHATASES: CPT

## 2019-01-16 PROCEDURE — 80061 LIPID PANEL: CPT

## 2019-01-17 ENCOUNTER — OFFICE VISIT (OUTPATIENT)
Dept: MEDICAL GROUP | Facility: PHYSICIAN GROUP | Age: 64
End: 2019-01-17
Payer: COMMERCIAL

## 2019-01-17 VITALS
OXYGEN SATURATION: 96 % | BODY MASS INDEX: 30.88 KG/M2 | DIASTOLIC BLOOD PRESSURE: 84 MMHG | TEMPERATURE: 97.5 F | RESPIRATION RATE: 14 BRPM | WEIGHT: 233 LBS | HEIGHT: 73 IN | SYSTOLIC BLOOD PRESSURE: 124 MMHG | HEART RATE: 60 BPM

## 2019-01-17 DIAGNOSIS — I82.4Z2 DEEP VEIN THROMBOSIS (DVT) OF DISTAL VEIN OF LEFT LOWER EXTREMITY, UNSPECIFIED CHRONICITY (HCC): ICD-10-CM

## 2019-01-17 DIAGNOSIS — E78.2 MIXED HYPERLIPIDEMIA: ICD-10-CM

## 2019-01-17 DIAGNOSIS — E66.9 CLASS 1 OBESITY WITHOUT SERIOUS COMORBIDITY WITH BODY MASS INDEX (BMI) OF 30.0 TO 30.9 IN ADULT, UNSPECIFIED OBESITY TYPE: ICD-10-CM

## 2019-01-17 DIAGNOSIS — G40.909 SEIZURE DISORDER (HCC): ICD-10-CM

## 2019-01-17 DIAGNOSIS — R76.8 HEPATITIS C ANTIBODY TEST POSITIVE: ICD-10-CM

## 2019-01-17 DIAGNOSIS — F43.10 PTSD (POST-TRAUMATIC STRESS DISORDER): ICD-10-CM

## 2019-01-17 DIAGNOSIS — D68.59 HYPERCOAGULABLE STATE (HCC): ICD-10-CM

## 2019-01-17 DIAGNOSIS — E53.8 VITAMIN B12 DEFICIENCY: ICD-10-CM

## 2019-01-17 PROCEDURE — 99214 OFFICE O/P EST MOD 30 MIN: CPT | Performed by: FAMILY MEDICINE

## 2019-01-17 RX ORDER — WARFARIN SODIUM 2.5 MG/1
2.5 TABLET ORAL
COMMUNITY
End: 2019-01-17 | Stop reason: SDUPTHER

## 2019-01-17 RX ORDER — VALPROIC ACID 250 MG/1
1000 CAPSULE, LIQUID FILLED ORAL
COMMUNITY
End: 2019-01-17

## 2019-01-17 RX ORDER — WARFARIN SODIUM 10 MG/1
10 TABLET ORAL DAILY
Qty: 90 TAB | Refills: 0 | Status: SHIPPED | OUTPATIENT
Start: 2019-01-17 | End: 2019-07-25 | Stop reason: SDUPTHER

## 2019-01-17 RX ORDER — WARFARIN SODIUM 2.5 MG/1
2.5 TABLET ORAL
Qty: 30 TAB | Refills: 0 | Status: SHIPPED | OUTPATIENT
Start: 2019-01-23 | End: 2019-08-14

## 2019-01-17 RX ORDER — VALPROIC ACID 250 MG/1
500 CAPSULE, LIQUID FILLED ORAL
COMMUNITY

## 2019-01-17 RX ORDER — SERTRALINE HYDROCHLORIDE 100 MG/1
100 TABLET, FILM COATED ORAL DAILY
COMMUNITY

## 2019-01-17 RX ORDER — PHENOBARBITAL 64.8 MG/1
3 TABLET ORAL DAILY
COMMUNITY

## 2019-01-17 NOTE — PROGRESS NOTES
CC: I need refills    HISTORY OF PRESENT ILLNESS: Patient is a 63 y.o. male established patient who presents today to discuss the following chronic conditions.  [facility]    Health Maintenance: Completed    Seizure disorder (CMS-HCC)  [facility].  He was recently started on valproic acid for mood symptoms.  His dose was decreased due to nausea but he is tolerating 500 mg currently.    Deep vein thrombosis (DVT) of distal vein of left lower extremity (CMS-HCC)  He has a remote history of a DVT and has been on coumadin since due to a hypercoagulable.    Vitamin B12 deficiency  His vitamin B12 level is increasing on supplementation, level as of yesterday was ~ 600    Hyperlipidemia  The 10-year ASCVD risk score (Summer GARCIA Jr., et al., 2013) is: 13.7%    Values used to calculate the score:      Age: 63 years      Sex: Male      Is Non- : No      Diabetic: No      Tobacco smoker: No      Systolic Blood Pressure: 124 mmHg      Is BP treated: No      HDL Cholesterol: 35 mg/dL      Total Cholesterol: 198 mg/dL      Hepatitis C antibody test positive  [facility], negative RNA on 9/27/2018, [facility], mild splenomegaly noted.  Labs from yesterday show normal CMP, alk phos isoenzymes pending.    PTSD (post-traumatic stress disorder)  [facility].  He reports his mood is doing well and he denies depression or suicidal thoughts.  He is on sertraline and valproic acid which are provided through the [facility].      Patient Active Problem List    Diagnosis Date Noted   • Hepatitis C antibody test positive 10/17/2018   • Vitamin B12 deficiency 09/25/2018   • PTSD (post-traumatic stress disorder) 09/24/2018   • Severe episode of recurrent major depressive disorder, without psychotic features (HCC) 09/24/2018   • Elevated alkaline phosphatase level 07/09/2018   •  Hyperlipidemia 06/22/2017   • Seizure disorder ([facility]) 12/22/2016   • Deep vein thrombosis (DVT) of distal vein of left lower extremity ([facility]) 12/22/2016   • Class 1 obesity in adult 12/22/2016   • Hypercoagulable state ([facility]) 12/22/2016      Allergies:Codeine; Demerol; Penicillins; and Wasp venom    Current Outpatient Prescriptions   Medication Sig Dispense Refill   • sertraline (ZOLOFT) 100 MG Tab Take 100 mg by mouth every day.     • PHENYTOIN PO Take 100 mg by mouth.     • phenobarbital (LUMINAL) 64.8 MG tablet Take 3 Tabs by mouth every day.     • valproic acid (DEPAKENE) 250 MG Cap Take 500 mg by mouth every bedtime.     • warfarin (COUMADIN) 10 MG Tab Take 1 Tab by mouth every day. Take one tablet daily as directed by  [facility] 90 Tab 0   • [START ON 1/23/2019] warfarin (COUMADIN) 2.5 MG Tab Take 1 Tab by mouth every Wednesday. 30 Tab 0   • cloNIDine (CATAPRES) 0.1 MG Tab TAKE 1 TAB BY MOUTH 3 TIMES A DAY AS NEEDED FOR ANGER/ANXIETY  0   • phenytoin ER (DILANTIN) 100 MG Cap Take 100 mg by mouth 3 times a day. Pt takes 5 Caps QOD alternating with 6 Caps as Directed       No current facility-administered medications for this visit.        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     - HEENT: Negative for headaches, vision changes, hearing changes, ear pain, ear discharge, rhinorrhea, sinus congestion, sore throat, and neck pain.      - Respiratory: Negative for cough, sputum production, chest congestion,  "dyspnea, wheezing, and crackles.      - Cardiovascular: Negative for chest pain, palpitations, orthopnea, and bilateral lower extremity edema.     - Neurological: Negative for dizziness, tingling, tremors, focal sensory deficit, focal weakness and headaches.       Exam:    Blood pressure 124/84, pulse 60, temperature 36.4 °C (97.5 °F), resp. rate 14, height 1.854 m (6' 1\"), weight 105.7 kg (233 lb), SpO2 96 %. Body mass index is 30.74 kg/m².    General:  Well nourished, well developed male in NAD  Head is grossly normal.  Neck: Supple without JVD or bruit. Thyroid is not enlarged.  Pulmonary: Clear to ausculation and percussion.  Normal effort. No rales, ronchi, or wheezing.  Cardiovascular: Regular rate and rhythm without murmur. Carotid and radial pulses are intact and equal bilaterally.  Extremities: no clubbing, cyanosis, or edema.    Please note that this dictation was created using voice recognition software. I have made every reasonable attempt to correct obvious errors, but I expect that there are errors of grammar and possibly content that I did not discover before finalizing the note.    Assessment/Plan:  1. Seizure disorder (HCC)  This is managed by Dr. Miguel and he has been asymptomatic.  He was recently started on an additional antiepileptic for mood symptoms as well. He will continue to follow with Dr. Miguel.    2. Deep vein thrombosis (DVT) of distal vein of left lower extremity, unspecified chronicity (HCC)  Continue long term anticoagulation, has follow up with the anticoagulation service in two weeks.  - warfarin (COUMADIN) 10 MG Tab; Take 1 Tab by mouth every day. Take one tablet daily as directed by  [facility]  Dispense: 90 Tab; Refill: 0  - warfarin (COUMADIN) 2.5 MG Tab; Take 1 Tab by mouth every Wednesday.  Dispense: 30 Tab; Refill: 0    3. Hypercoagulable state (HCC)  - warfarin (COUMADIN) 10 MG Tab; Take 1 Tab by mouth every day. Take one tablet daily as directed by  " [facility]  Dispense: 90 Tab; Refill: 0  - warfarin (COUMADIN) 2.5 MG Tab; Take 1 Tab by mouth every Wednesday.  Dispense: 30 Tab; Refill: 0    4. Hepatitis C antibody test positive  [facility], does to appear to be active and labs are reassuring.    5. Vitamin B12 deficiency  His levels are improving with oral supplementation, will continue current dose.    6. Mixed hyperlipidemia  his 10 year cardiovascular risk is 13%.  The recommendations regarding treatment of elevated cholesterol were reviewed.  he does meet criteria for treatment at this time with aspirin and a statin.  The benefits and risks of statin therapy were discussed including decreasing risk by ~ 30% and increased risk of myalgia and to a lesser degree diabetes and dementia.  Lifestyle interventions including exercise and a diet low in trans and saturated fat and added cholesterol were reviewed.  Because he is on warfarin I have advised against aspirin for primary prevention.  He would like to try lifestyle for 6 months prior to considering a statin which is reasonable as his levels are not extreme and his other risks are minimal.       - Lipid Profile; Future    7. Class 1 obesity without serious comorbidity with body mass index (BMI) of 30.0 to 30.9 in adult, unspecified obesity type  The increased risk for heart disease, diabetes and overall mortality were discussed with him.  General guidelines for weight loss including increasing fiber intake, limiting simple carbs and sweetened drinks, calorie restriction and increasing activity were reviewed.        8. PTSD (post-traumatic stress disorder)  [facility], he seems to be doing well.

## 2019-01-17 NOTE — ASSESSMENT & PLAN NOTE
[facility].  He was recently started on valproic acid for mood symptoms.  His dose was decreased due to nausea but he is tolerating 500 mg currently.

## 2019-01-17 NOTE — PATIENT INSTRUCTIONS
Cholesterol  Cholesterol is a white, waxy, fat-like substance needed by your body in small amounts. The liver makes all the cholesterol you need. Cholesterol is carried from the liver by the blood through the blood vessels. Deposits of cholesterol (plaque) may build up on blood vessel walls. These make the arteries narrower and stiffer. Cholesterol plaques increase the risk for heart attack and stroke.   You cannot feel your cholesterol level even if it is very high. The only way to know it is high is with a blood test. Once you know your cholesterol levels, you should keep a record of the test results. Work with your health care provider to keep your levels in the desired range.   WHAT DO THE RESULTS MEAN?  · Total cholesterol is a rough measure of all the cholesterol in your blood.    · LDL is the so-called bad cholesterol. This is the type that deposits cholesterol in the walls of the arteries. You want this level to be low.    · HDL is the good cholesterol because it cleans the arteries and carries the LDL away. You want this level to be high.  · Triglycerides are fat that the body can either burn for energy or store. High levels are closely linked to heart disease.    WHAT ARE THE DESIRED LEVELS OF CHOLESTEROL?  · Total cholesterol below 200.    · LDL below 100 for people at risk, below 70 for those at very high risk.    · HDL above 50 is good, above 60 is best.    · Triglycerides below 150.    HOW CAN I LOWER MY CHOLESTEROL?  · Diet. Follow your diet programs as directed by your health care provider.    ¨ Choose fish or white meat chicken and turkey, roasted or baked. Limit fatty cuts of red meat, fried foods, and processed meats, such as sausage and lunch meats.    ¨ Eat lots of fresh fruits and vegetables.  ¨ Choose whole grains, beans, pasta, potatoes, and cereals.    ¨ Use only small amounts of olive, corn, or canola oils.    ¨ Avoid butter, mayonnaise, shortening, or palm kernel oils.  ¨ Avoid foods with  trans fats.    ¨ Drink skim or nonfat milk and eat low-fat or nonfat yogurt and cheeses. Avoid whole milk, cream, ice cream, egg yolks, and full-fat cheeses.    ¨ Healthy desserts include te food cake, lorne snaps, animal crackers, hard candy, popsicles, and low-fat or nonfat frozen yogurt. Avoid pastries, cakes, pies, and cookies.    · Exercise. Follow your exercise programs as directed by your health care provider.    ¨ A regular program helps decrease LDL and raise HDL.    ¨ A regular program helps with weight control.    ¨ Do things that increase your activity level like gardening, walking, or taking the stairs. Ask your health care provider about how you can be more active in your daily life.    · Medicine. Take medicine only as directed by your health care provider.    ¨ Medicine may be prescribed by your health care provider to help lower cholesterol and decrease the risk for heart disease.    ¨ If you have several risk factors, you may need medicine even if your levels are normal.     This information is not intended to replace advice given to you by your health care provider. Make sure you discuss any questions you have with your health care provider.     Document Released: 09/12/2002 Document Revised: 01/08/2016 Document Reviewed: 10/01/2014  [employer] Interactive Patient Education ©2016 [employer]        Mediterranean Diet  A Mediterranean diet refers to food and lifestyle choices that are based on the traditions of countries located on the [place]. This way of eating has been shown to help prevent certain conditions and improve outcomes for people who have chronic diseases, like kidney disease and heart disease.  What are tips for following this plan?  Lifestyle  · Cook and eat meals together with your family, when possible.  · Drink enough fluid to keep your urine clear or pale yellow.  · Be physically active every day. This includes:  ¨ Aerobic exercise like running or  swimming.  ¨ Leisure activities like gardening, walking, or housework.  · Get 7-8 hours of sleep each night.  · If recommended by your health care provider, drink red wine in moderation. This means 1 glass a day for nonpregnant women and 2 glasses a day for men. A glass of wine equals 5 oz (150 mL).  Reading food labels  · Check the serving size of packaged foods. For foods such as rice and pasta, the serving size refers to the amount of cooked product, not dry.  · Check the total fat in packaged foods. Avoid foods that have saturated fat or trans fats.  · Check the ingredients list for added sugars, such as corn syrup.  Shopping  · At the grocery store, buy most of your food from the areas near the walls of the store. This includes:  ¨ Fresh fruits and vegetables (produce).  ¨ Grains, beans, nuts, and seeds. Some of these may be available in unpackaged forms or large amounts (in bulk).  ¨ Fresh seafood.  ¨ Poultry and eggs.  ¨ Low-fat dairy products.  · Buy whole ingredients instead of prepackaged foods.  · Buy fresh fruits and vegetables in-season from local [employer] markets.  · Buy frozen fruits and vegetables in resealable bags.  · If you do not have access to quality fresh seafood, buy precooked frozen shrimp or canned fish, such as tuna, salmon, or sardines.  · Buy small amounts of raw or cooked vegetables, salads, or olives from the deli or salad bar at your store.  · Stock your pantry so you always have certain foods on hand, such as olive oil, canned tuna, canned tomatoes, rice, pasta, and beans.  Cooking  · Cook foods with extra-virgin olive oil instead of using butter or other vegetable oils.  · Have meat as a side dish, and have vegetables or grains as your main dish. This means having meat in small portions or adding small amounts of meat to foods like pasta or stew.  · Use beans or vegetables instead of meat in common dishes like chili or lasagna.  · [place] with different cooking methods. Try  roasting or broiling vegetables instead of steaming or sautéeing them.  · Add frozen vegetables to soups, stews, pasta, or rice.  · Add nuts or seeds for added healthy fat at each meal. You can add these to yogurt, salads, or vegetable dishes.  · Marinate fish or vegetables using olive oil, lemon juice, garlic, and fresh herbs.  Meal planning  · Plan to eat 1 vegetarian meal one day each week. Try to work up to 2 vegetarian meals, if possible.  · Eat seafood 2 or more times a week.  · Have healthy snacks readily available, such as:  ¨ Vegetable sticks with hummus.  ¨ Greek yogurt.  ¨ Fruit and nut trail mix.  · Eat balanced meals throughout the week. This includes:  ¨ Fruit: 2-3 servings a day  ¨ Vegetables: 4-5 servings a day  ¨ Low-fat dairy: 2 servings a day  ¨ Fish, poultry, or lean meat: 1 serving a day  ¨ Beans and legumes: 2 or more servings a week  ¨ Nuts and seeds: 1-2 servings a day  ¨ Whole grains: 6-8 servings a day  ¨ Extra-virgin olive oil: 3-4 servings a day  · Limit red meat and sweets to only a few servings a month  What are my food choices?  · Mediterranean diet  ¨ Recommended  ¨ Grains: Whole-grain pasta. Brown rice. Bulgar wheat. Polenta. Couscous. Whole-wheat bread. Oatmeal. Quinoa.  ¨ Vegetables: Artichokes. Beets. Broccoli. Cabbage. Carrots. Eggplant. Green beans. Chard. Kale. Spinach. Onions. Leeks. Peas. Squash. Tomatoes. Peppers. Radishes.  ¨ Fruits: Apples. Apricots. Avocado. Berries. Bananas. Cherries. Dates. Figs. Grapes. [place]. Melon. Oranges. Peaches. Plums. Pomegranate.  ¨ Meats and other protein foods: Beans. Almonds. Sunflower seeds. Pine nuts. Peanuts. Cod. [place]. Scallops. Shrimp. Tuna. Tilapia. Clams. Oysters. Eggs.  ¨ Dairy: Low-fat milk. Cheese. Greek yogurt.  ¨ Beverages: Water. Red wine. Herbal tea.  ¨ Fats and oils: Extra virgin olive oil. Avocado oil. Grape seed oil.  ¨ Sweets and desserts: Greek yogurt with honey. Baked apples. Poached pears. Trail mix.  ¨ Seasoning and  other foods: Basil. Cilantro. Coriander. Cumin. Mint. Parsley. [place]. Rosemary. Tarragon. Garlic. Oregano. Thyme. Pepper. Balsalmic vinegar. Tahini. Hummus. Tomato sauce. Olives. Mushrooms.  ¨ Limit these  ¨ Grains: Prepackaged pasta or rice dishes. Prepackaged cereal with added sugar.  ¨ Vegetables: Deep fried potatoes (french fries).  ¨ Fruits: Fruit canned in syrup.  ¨ Meats and other protein foods: Beef. Pork. Lamb. Poultry with skin. Hot dogs. Diaz.  ¨ Dairy: Ice cream. Sour cream. Whole milk.  ¨ Beverages: Juice. Sugar-sweetened soft drinks. Beer. Liquor and spirits.  ¨ Fats and oils: Butter. Canola oil. Vegetable oil. Beef fat (tallow). Lard.  ¨ Sweets and desserts: Cookies. Cakes. Pies. Candy.  ¨ Seasoning and other foods: Mayonnaise. Premade sauces and marinades.  ¨ The items listed may not be a complete list. Talk with your dietitian about what dietary choices are right for you.  Summary  · The Mediterranean diet includes both food and lifestyle choices.  · Eat a variety of fresh fruits and vegetables, beans, nuts, seeds, and whole grains.  · Limit the amount of red meat and sweets that you eat.  · Talk with your health care provider about whether it is safe for you to drink red wine in moderation. This means 1 glass a day for nonpregnant women and 2 glasses a day for men. A glass of wine equals 5 oz (150 mL).  This information is not intended to replace advice given to you by your health care provider. Make sure you discuss any questions you have with your health care provider.  Document Released: 08/10/2017 Document Revised: 09/12/2017 Document Reviewed: 08/10/2017  [employer] Interactive Patient Education © 2017 [employer]

## 2019-01-17 NOTE — ASSESSMENT & PLAN NOTE
The 10-year ASCVD risk score (Summer GARCIA Jr., et al., 2013) is: 13.7%    Values used to calculate the score:      Age: 63 years      Sex: Male      Is Non- : No      Diabetic: No      Tobacco smoker: No      Systolic Blood Pressure: 124 mmHg      Is BP treated: No      HDL Cholesterol: 35 mg/dL      Total Cholesterol: 198 mg/dL

## 2019-01-17 NOTE — ASSESSMENT & PLAN NOTE
[facility].  He reports his mood is doing well and he denies depression or suicidal thoughts.  He is on sertraline and valproic acid which are provided through [facility].

## 2019-01-17 NOTE — ASSESSMENT & PLAN NOTE
[facility], negative RNA on 9/27/2018, [facility], mild splenomegaly noted.  Labs from yesterday show normal CMP, alk phos isoenzymes pending.

## 2019-01-18 ENCOUNTER — TELEPHONE (OUTPATIENT)
Dept: MEDICAL GROUP | Facility: PHYSICIAN GROUP | Age: 64
End: 2019-01-18

## 2019-01-18 NOTE — TELEPHONE ENCOUNTER
Good morning Dr. Vargas,    Pt called and stated that he would like to get a copy of his labs results by mail. Please advise.    Thank you.

## 2019-01-19 LAB
ALP BONE SERPL-CCNC: 36 U/L (ref 0–55)
ALP ISOS SERPL HS-CCNC: 0 U/L
ALP LIVER SERPL-CCNC: 67 U/L (ref 0–94)
ALP SERPL-CCNC: 103 U/L (ref 40–120)

## 2019-01-19 NOTE — TELEPHONE ENCOUNTER
Can we print them and mail them to him?  I do not know how to make a letter when they are no longer in my inbox

## 2019-01-30 ENCOUNTER — ANTICOAGULATION VISIT (OUTPATIENT)
Dept: MEDICAL GROUP | Facility: PHYSICIAN GROUP | Age: 64
End: 2019-01-30
Payer: COMMERCIAL

## 2019-01-30 DIAGNOSIS — G40.909 SEIZURE DISORDER (HCC): ICD-10-CM

## 2019-01-30 DIAGNOSIS — I82.5Z2 CHRONIC DEEP VEIN THROMBOSIS (DVT) OF DISTAL VEIN OF LEFT LOWER EXTREMITY (HCC): ICD-10-CM

## 2019-01-30 DIAGNOSIS — D68.59 HYPERCOAGULABLE STATE (HCC): ICD-10-CM

## 2019-01-30 LAB — INR PPP: 2.4 (ref 2–3.5)

## 2019-01-30 PROCEDURE — 93793 ANTICOAG MGMT PT WARFARIN: CPT | Performed by: FAMILY MEDICINE

## 2019-01-30 PROCEDURE — 85610 PROTHROMBIN TIME: CPT | Performed by: FAMILY MEDICINE

## 2019-01-30 NOTE — PROGRESS NOTES
Anticoagulation Summary  As of 2019    INR goal:   2.0-3.0   TTR:   67.4 % (8.1 mo)   INR used for dosin.4 (2019)   Warfarin maintenance plan:   5 mg (10 mg x 0.5) every Wed; 10 mg (10 mg x 1) all other days   Weekly warfarin total:   65 mg   Plan last modified:   Josselin IvoryD (2018)   Next INR check:   4/10/2019   Target end date:   Indefinite    Indications    Deep vein thrombosis (DVT) of distal vein of left lower extremity (HCC) [I82.4Z2]  Hypercoagulable state (HCC) [D68.59]  Seizure disorder (HCC) [G40.909]             Anticoagulation Episode Summary     [facility]    Preferred lab:       Send INR reminders to:       Comments:         Anticoagulation Care Providers     Provider Role Specialty Phone number    Shari Vargas M.D. Referring Family Medicine [phone]    [facility] Responsible  [phone]    Patsy Collins, JosselinD Responsible          Anticoagulation Patient Findings  Patient Findings     Negatives:   Signs/symptoms of thrombosis, Signs/symptoms of bleeding, Laboratory test error suspected, Change in health, Change in alcohol use, Change in activity, Upcoming invasive procedure, Emergency department visit, Upcoming dental procedure, Missed doses, Extra doses, Change in medications, Change in diet/appetite, Hospital admission, Bruising, Other complaints        History of Present Illness: follow up appointment for chronic anticoagulation with the high risk medication, warfarin for DVT    Pt remains therapeutic today. Continue current dosing regimen.  Follow up in 10 weeks, to reduce the risk of adverse events related to this high risk medication, warfarin.    Patsy Collins Clinical Pharmacist    Pt declines vitals

## 2019-03-05 ENCOUNTER — OFFICE VISIT (OUTPATIENT)
Dept: MEDICAL GROUP | Facility: PHYSICIAN GROUP | Age: 64
End: 2019-03-05
Payer: COMMERCIAL

## 2019-03-05 VITALS
RESPIRATION RATE: 14 BRPM | HEART RATE: 87 BPM | OXYGEN SATURATION: 97 % | WEIGHT: 237 LBS | SYSTOLIC BLOOD PRESSURE: 114 MMHG | TEMPERATURE: 97.3 F | DIASTOLIC BLOOD PRESSURE: 78 MMHG | HEIGHT: 73 IN | BODY MASS INDEX: 31.41 KG/M2

## 2019-03-05 DIAGNOSIS — R05.9 COUGH: ICD-10-CM

## 2019-03-05 PROCEDURE — 99213 OFFICE O/P EST LOW 20 MIN: CPT | Performed by: FAMILY MEDICINE

## 2019-03-06 NOTE — PATIENT INSTRUCTIONS
Consider taking zyrtec or claritin daily for a few weeks to a month  Also consider using flonase daily, 2 sprays in each nostril for at least 2 weeks    A neti-pot or saline nasal wash may help, I would use this in the morning and at night.    Sinus Rinse    WHAT IS A SINUS RINSE?  A sinus rinse is a simple home treatment that is used to rinse your sinuses with a sterile mixture of salt and water (saline solution). Sinuses are air-filled spaces in your skull behind the bones of your face and forehead that open into your nasal cavity.  You will use the following:  · Saline solution.  · Neti pot or spray bottle. This releases the saline solution into your nose and through your sinuses. Neti pots and spray bottles can be purchased at your local pharmacy, a [employer] food store, or online.  WHEN WOULD I DO A SINUS RINSE?  A sinus rinse can help to clear mucus, dirt, dust, or pollen from the nasal cavity. You may do a sinus rinse when you have a cold, a virus, nasal allergy symptoms, a sinus infection, or stuffiness in the nose or sinuses.  If you are considering a sinus rinse:  · Ask your child's health care provider before performing a sinus rinse on your child.  · Do not do a sinus rinse if you have had ear or nasal surgery, ear infection, or blocked ears.  HOW DO I DO A SINUS RINSE?  · Wash your hands.  · Disinfect your device according to the directions provided and then dry it.  · Use the solution that comes with your device or one that is sold separately in stores. Follow the mixing directions on the package.  · Fill your device with the amount of saline solution as directed by the device instructions.  · Stand over a sink and tilt your head sideways over the sink.  · Place the spout of the device in your upper nostril (the one closer to the ceiling).  · Gently pour or squeeze the saline solution into the nasal cavity. The liquid should drain to the lower nostril if you are not overly congested.  · Gently blow your  nose. Blowing too hard may cause ear pain.  · Repeat in the other nostril.  · Clean and rinse your device with clean water and then air-dry it.  ARE THERE RISKS OF A SINUS RINSE?  Sinus rinse is generally very safe and effective. However, there are a few risks, which include:  · A burning sensation in the sinuses. This may happen if you do not make the saline solution as directed. Make sure to follow all directions when making the saline solution.  · Infection from contaminated water. This is rare, but possible.  · Nasal irritation.  This information is not intended to replace advice given to you by your health care provider. Make sure you discuss any questions you have with your health care provider.  Document Released: 07/15/2015 Document Revised: 08/13/2017 Document Reviewed: 05/05/2015  Elsevier Interactive Patient Education © 2017 [employer]

## 2019-03-06 NOTE — PROGRESS NOTES
CC: dark mucous    HISTORY OF PRESENT ILLNESS: Patient is a 63 y.o. male established patient who presents today to discuss the following new concern    Health Maintenance: up to date    Cough  He had a cough and fever starting 6 weeks ago.  The cough resolved a week ago but he still notes thick mucous in his mouth over the last week.  He denies a cough at night.  Symptoms are worse in the morning and evening.  He denies current fevers or systemic symptoms.        Patient Active Problem List    Diagnosis Date Noted   • Cough 03/05/2019   • Hepatitis C antibody test positive 10/17/2018   • Vitamin B12 deficiency 09/25/2018   • PTSD (post-traumatic stress disorder) 09/24/2018   • Severe episode of recurrent major depressive disorder, without psychotic features ([facility]) 09/24/2018   • Elevated alkaline phosphatase level 07/09/2018   • Hyperlipidemia 06/22/2017   • Seizure disorder ([facility]) 12/22/2016   • Deep vein thrombosis (DVT) of distal vein of left lower extremity ([facility]) 12/22/2016   • Class 1 obesity in adult 12/22/2016   • Hypercoagulable state ([facility]) 12/22/2016      Allergies:Codeine; Demerol; Penicillins; and Wasp venom    Current Outpatient Prescriptions   Medication Sig Dispense Refill   • sertraline (ZOLOFT) 100 MG Tab Take 100 mg by mouth every day.     • PHENYTOIN PO Take 100 mg by mouth.     • phenobarbital (LUMINAL) 64.8 MG tablet Take 3 Tabs by mouth every day.     • valproic acid (DEPAKENE) 250 MG Cap Take 500 mg by mouth every bedtime.     • warfarin (COUMADIN) 10 MG Tab Take 1 Tab by mouth every day. Take one tablet daily as directed by  [facility] 90 Tab 0   • warfarin (COUMADIN) 2.5 MG Tab Take 1 Tab by mouth every Wednesday. 30 Tab 0   • cloNIDine (CATAPRES) 0.1 MG Tab TAKE 1 TAB BY MOUTH 3 TIMES A DAY AS NEEDED FOR ANGER/ANXIETY  0   • phenytoin ER (DILANTIN) 100 MG Cap Take 100 mg by mouth 3 times a day. Pt takes 5 Caps QOD alternating with 6 Caps as Directed       No current  "facility-administered medications for this visit.        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     - HEENT: Negative for headaches, vision changes, hearing changes, ear pain, ear discharge, sinus congestion, sore throat, and neck pain.      - Respiratory: Negative for cough, sputum production, chest congestion, dyspnea, wheezing, and crackles.          Exam:    Blood pressure 114/78, pulse 87, temperature 36.3 °C (97.3 °F), resp. rate 14, height 1.854 m (6' 1\"), weight 107.5 kg (237 lb), SpO2 97 %. Body mass index is 31.27 kg/m².    Physical Exam  General: well appearing  Head: atraumatic, normocephalic  Eyes: normal lids and conjunctiva, pupils equally reactive and responsive to light and accomodation  Ears: normal external ears, normal auditory canal and TM bilaterally  Nose: mild mucosal edema, clear rhinorrhea, sinuses not tender to palpation   Mouth: normal oral mucosa, uvula midline, tonsils normal without erythema and exudate  Neck: supple, no nuchal rigidity, no palpable submandibular or cervical lymph nodes  Heart: normal rate and rhythm , no extra sounds, no pedal edema  Lungs: Normal effort and rate, lungs clear on auscultation and percussion bilaterally  Abdomen: no tenderness, no distension, no organomegaly      Assessment/Plan:  1. Cough  I suspect upper airway cough syndrome with a residual post nasal drip.  His exam and vitals are reassuring and there is no evidence for a bacterial infection on exam today.  He " may try flonase or nasal saline for relief of symptoms but may also wait as this may resolve spontaneously.  He is advised to return or call for malaise, fever/chills, or onset of a new cough.  He agrees with this plan.

## 2019-03-06 NOTE — ASSESSMENT & PLAN NOTE
He had a cough and fever starting 6 weeks ago.  The cough resolved a week ago but he still notes thick mucous in his mouth over the last week.  He denies a cough at night.  Symptoms are worse in the morning and evening.  He denies current fevers or systemic symptoms.

## 2019-04-17 ENCOUNTER — ANTICOAGULATION VISIT (OUTPATIENT)
Dept: MEDICAL GROUP | Facility: PHYSICIAN GROUP | Age: 64
End: 2019-04-17
Payer: COMMERCIAL

## 2019-04-17 DIAGNOSIS — G40.909 SEIZURE DISORDER (HCC): ICD-10-CM

## 2019-04-17 DIAGNOSIS — D68.59 HYPERCOAGULABLE STATE (HCC): ICD-10-CM

## 2019-04-17 DIAGNOSIS — I82.5Z2 CHRONIC DEEP VEIN THROMBOSIS (DVT) OF DISTAL VEIN OF LEFT LOWER EXTREMITY (HCC): ICD-10-CM

## 2019-04-17 LAB — INR PPP: 2.1 (ref 2–3.5)

## 2019-04-17 PROCEDURE — 85610 PROTHROMBIN TIME: CPT | Performed by: FAMILY MEDICINE

## 2019-04-17 PROCEDURE — 93793 ANTICOAG MGMT PT WARFARIN: CPT | Performed by: FAMILY MEDICINE

## 2019-04-17 NOTE — PROGRESS NOTES
Anticoagulation Summary  As of 2019    INR goal:   2.0-3.0   TTR:   75.2 % (10.6 mo)   INR used for dosin.1 (2019)   Warfarin maintenance plan:   5 mg (10 mg x 0.5) every Wed; 10 mg (10 mg x 1) all other days   Weekly warfarin total:   65 mg   Plan last modified:   Josselin IvoryD (2018)   Next INR check:   7/10/2019   Target end date:   Indefinite    Indications    Deep vein thrombosis (DVT) of distal vein of left lower extremity (HCC) [I82.4Z2]  Hypercoagulable state (HCC) [D68.59]  Seizure disorder (HCC) [G40.909]             Anticoagulation Episode Summary     INR check location:   [facility]    Preferred lab:       Send INR reminders to:       Comments:         Anticoagulation Care Providers     Provider Role Specialty Phone number    Shari Vargas M.D. Referring Family Medicine [phone]    [facility] Responsible  [phone]    Josselin IvoryD Responsible          Anticoagulation Patient Findings  Patient Findings     Negatives:   Signs/symptoms of thrombosis, Signs/symptoms of bleeding, Laboratory test error suspected, Change in health, Change in alcohol use, Change in activity, Upcoming invasive procedure, Emergency department visit, Upcoming dental procedure, Missed doses, Extra doses, Change in medications, Change in diet/appetite, Hospital admission, Bruising, Other complaints        History of Present Illness: follow up appointment for chronic anticoagulation with the high risk medication, warfarin for DVT    Pt remains therapeutic today. Continue current dosing regimen.  Follow up in 12 weeks, to reduce the risk of adverse events related to this high risk medication, warfarin.    Patsy Collins Clinical Pharmacist    Pt declines vitals today

## 2019-05-21 DIAGNOSIS — Z79.01 CHRONIC ANTICOAGULATION: ICD-10-CM

## 2019-07-10 ENCOUNTER — OFFICE VISIT (OUTPATIENT)
Dept: URGENT CARE | Facility: CLINIC | Age: 64
End: 2019-07-10
Payer: MEDICARE

## 2019-07-10 ENCOUNTER — ANTICOAGULATION VISIT (OUTPATIENT)
Dept: MEDICAL GROUP | Facility: PHYSICIAN GROUP | Age: 64
End: 2019-07-10
Payer: MEDICARE

## 2019-07-10 VITALS
OXYGEN SATURATION: 96 % | DIASTOLIC BLOOD PRESSURE: 78 MMHG | TEMPERATURE: 97 F | RESPIRATION RATE: 14 BRPM | SYSTOLIC BLOOD PRESSURE: 116 MMHG | WEIGHT: 232 LBS | HEART RATE: 63 BPM | BODY MASS INDEX: 30.75 KG/M2 | HEIGHT: 73 IN

## 2019-07-10 VITALS — SYSTOLIC BLOOD PRESSURE: 96 MMHG | DIASTOLIC BLOOD PRESSURE: 76 MMHG | HEART RATE: 61 BPM | OXYGEN SATURATION: 96 %

## 2019-07-10 DIAGNOSIS — I82.5Z2 CHRONIC DEEP VEIN THROMBOSIS (DVT) OF DISTAL VEIN OF LEFT LOWER EXTREMITY (HCC): ICD-10-CM

## 2019-07-10 DIAGNOSIS — L03.213 PERIORBITAL CELLULITIS OF LEFT EYE: ICD-10-CM

## 2019-07-10 DIAGNOSIS — D68.59 HYPERCOAGULABLE STATE (HCC): ICD-10-CM

## 2019-07-10 DIAGNOSIS — H10.32 ACUTE CONJUNCTIVITIS OF LEFT EYE, UNSPECIFIED ACUTE CONJUNCTIVITIS TYPE: ICD-10-CM

## 2019-07-10 DIAGNOSIS — G40.909 SEIZURE DISORDER (HCC): ICD-10-CM

## 2019-07-10 LAB — INR PPP: 2.2 (ref 2–3.5)

## 2019-07-10 PROCEDURE — 93793 ANTICOAG MGMT PT WARFARIN: CPT | Performed by: FAMILY MEDICINE

## 2019-07-10 PROCEDURE — 85610 PROTHROMBIN TIME: CPT | Performed by: FAMILY MEDICINE

## 2019-07-10 PROCEDURE — 99214 OFFICE O/P EST MOD 30 MIN: CPT | Performed by: NURSE PRACTITIONER

## 2019-07-10 RX ORDER — POLYMYXIN B SULFATE AND TRIMETHOPRIM 1; 10000 MG/ML; [USP'U]/ML
1 SOLUTION OPHTHALMIC EVERY 4 HOURS
Qty: 10 ML | Refills: 0 | Status: SHIPPED | OUTPATIENT
Start: 2019-07-10 | End: 2019-07-17

## 2019-07-10 RX ORDER — DOXYCYCLINE HYCLATE 100 MG/1
100 CAPSULE ORAL 2 TIMES DAILY
Qty: 14 EACH | Refills: 0 | Status: SHIPPED | OUTPATIENT
Start: 2019-07-10 | End: 2019-07-17

## 2019-07-10 ASSESSMENT — ENCOUNTER SYMPTOMS
VOMITING: 0
DOUBLE VISION: 0
PHOTOPHOBIA: 0
FEVER: 0
HEADACHES: 0
DIZZINESS: 0
EYE REDNESS: 1
NECK PAIN: 0
NAUSEA: 0
BLURRED VISION: 0
EYE PAIN: 0
EYE DISCHARGE: 1
CHILLS: 0
SORE THROAT: 0
COUGH: 0

## 2019-07-10 ASSESSMENT — LIFESTYLE VARIABLES: SUBSTANCE_ABUSE: 0

## 2019-07-10 NOTE — PROGRESS NOTES
Anticoagulation Summary  As of 7/10/2019    INR goal:   2.0-3.0   TTR:   80.4 % (1.1 y)   INR used for dosin.20 (7/10/2019)   Warfarin maintenance plan:   5 mg (10 mg x 0.5) every Wed; 10 mg (10 mg x 1) all other days   Weekly warfarin total:   65 mg   Plan last modified:   Patsy Collins PharmD (2018)   Next INR check:   10/2/2019   Target end date:   Indefinite    Indications    Deep vein thrombosis (DVT) of distal vein of left lower extremity (HCC) [I82.4Z2]  Hypercoagulable state (HCC) [D68.59]  Seizure disorder (HCC) [G40.909]             Anticoagulation Episode Summary     INR check location:   [facility]    Preferred lab:       Send INR reminders to:       Comments:         Anticoagulation Care Providers     Provider Role Specialty Phone number    Shari Vargas M.D. Referring Family Medicine [phone]    [facility] Responsible  [phone]    Patsy Collins, JosselinD Responsible          Anticoagulation Patient Findings  Patient Findings     Negatives:   Signs/symptoms of thrombosis, Signs/symptoms of bleeding, Laboratory test error suspected, Change in health, Change in alcohol use, Change in activity, Upcoming invasive procedure, Emergency department visit, Upcoming dental procedure, Missed doses, Extra doses, Change in medications, Change in diet/appetite, Hospital admission, Bruising, Other complaints              History of Present Illness: follow up appointment for chronic anticoagulation with the high risk medication, warfarin for history of DVT    Patient remains therapeutic today, instructed patient to continue current dosing regimen.  Follow up in 12 weeks, to reduce the risk of adverse events related to this high risk medication, warfarin.    Arthur Ramsey, Pharmacy Intern    Patsy Collins, Clinical Pharmacist

## 2019-07-10 NOTE — PROGRESS NOTES
"Subjective:      Pierre Saravia is a 63 y.o. male who presents with Eye Problem (left eye swelling)    Reviewed past medical, surgical and family history. Reviewed prescription and OTC medications with patient in electronic health record today.     Allergies   Allergen Reactions   • Codeine Hives   • Demerol Hives   • Penicillins Rash   • Wasp Venom Swelling             HPI this is a new problem.  Pierre is a 63-year-old male patient presents with left eye swelling and redness.  Onset of symptoms occurred 3 days ago.  He was working in his yard and thinks it is a piece of sagebrush blew into his eye which she is allergic to.  He is tried to rinse out his eye with water.  He no longer feels that there is anything in his eye.  He has been scratching at his eye.  He thinks he cut his eyelid last night from scratching.  He did have some drainage that was crusted on his eyelashes this morning.  His pain and discomfort are slowly getting worse.  Pain 3/10 \"irritated\".  Denies vision change, headache, fever, chills.    Review of Systems   Constitutional: Negative for chills and fever.   HENT: Negative for ear pain and sore throat.    Eyes: Positive for discharge and redness. Negative for blurred vision, double vision, photophobia and pain.   Respiratory: Negative for cough.    Cardiovascular: Negative for chest pain.   Gastrointestinal: Negative for nausea and vomiting.   Musculoskeletal: Negative for neck pain.   Neurological: Negative for dizziness and headaches.   Psychiatric/Behavioral: Negative for substance abuse.          Objective:     /78 (BP Location: Right arm, Patient Position: Sitting)   Pulse 63   Temp 36.1 °C (97 °F)   Resp 14   Ht 1.854 m (6' 1\")   Wt 105.2 kg (232 lb)   SpO2 96%   BMI 30.61 kg/m²      Physical Exam   Constitutional: Vital signs are normal. He appears well-developed and well-nourished. He is cooperative.  Non-toxic appearance. No distress.   HENT:   Head: Normocephalic. "   Eyes: Pupils are equal, round, and reactive to light. EOM and lids are normal. Lids are everted and swept, no foreign bodies found. Left eye exhibits discharge. Left conjunctiva is injected.       Cardiovascular: Normal rate and regular rhythm.    Pulmonary/Chest: Effort normal. No respiratory distress.   Neurological: He is alert.   Skin: Skin is warm and dry.   Psychiatric: He has a normal mood and affect. His speech is normal and behavior is normal.   Nursing note and vitals reviewed.              Assessment/Plan:     1. Acute conjunctivitis of left eye, unspecified acute conjunctivitis type    - polymixin-trimethoprim (POLYTRIM) 18496-3.1 UNIT/ML-% Solution; Place 1 Drop in both eyes every 4 hours for 7 days.  Dispense: 10 mL; Refill: 0    2. Periorbital cellulitis of left eye    - doxycycline (VIBRAMYCIN) 100 MG Cap; Take 1 Cap by mouth 2 times a day for 7 days.  Dispense: 14 Each; Refill: 0      Educated in proper administration of medication(s) ordered today including safety, possible SE, risks, benefits, rationale and alternatives to therapy.     Warm moist compresses to left eye twice daily to 3 times daily  Baby shampoo eyelash wash daily for 3 days  Cold compresses as needed swelling  Head of bed elevated at night to reduce swelling x2 days.    Return to clinic or PCP 4-5  days if current symptoms are not resolving in a satisfactory manner or sooner if new or worsening symptoms occur.   Patient was advised of signs and symptoms which would warrant further evaluation and /or emergent evaluation in ER.  Verbalized agreement with this treatment plan and seemed to understand without barriers. Questions were encouraged and answered to patients satisfaction.     Aftercare instructions were given to pt    Patient was educated that the doxycycline could interfere with his Coumadin therapy by increasing his INR.  He was advised to call his Coumadin clinic and let them know that he was placed on antibiotic.  He  may need to adjust his next lab draw.  Patient verbalizes understanding.  He agrees with current treatment plan.

## 2019-07-18 ENCOUNTER — OFFICE VISIT (OUTPATIENT)
Dept: MEDICAL GROUP | Facility: PHYSICIAN GROUP | Age: 64
End: 2019-07-18
Payer: MEDICARE

## 2019-07-18 VITALS
DIASTOLIC BLOOD PRESSURE: 64 MMHG | TEMPERATURE: 97.4 F | HEART RATE: 64 BPM | RESPIRATION RATE: 14 BRPM | SYSTOLIC BLOOD PRESSURE: 106 MMHG | OXYGEN SATURATION: 96 % | BODY MASS INDEX: 33.29 KG/M2 | WEIGHT: 245.8 LBS | HEIGHT: 72 IN

## 2019-07-18 DIAGNOSIS — E78.2 MIXED HYPERLIPIDEMIA: ICD-10-CM

## 2019-07-18 DIAGNOSIS — R74.8 ELEVATED ALKALINE PHOSPHATASE LEVEL: ICD-10-CM

## 2019-07-18 DIAGNOSIS — H10.32 ACUTE BACTERIAL CONJUNCTIVITIS OF LEFT EYE: ICD-10-CM

## 2019-07-18 DIAGNOSIS — E66.9 CLASS 1 OBESITY WITHOUT SERIOUS COMORBIDITY WITH BODY MASS INDEX (BMI) OF 30.0 TO 30.9 IN ADULT, UNSPECIFIED OBESITY TYPE: ICD-10-CM

## 2019-07-18 DIAGNOSIS — F33.42 RECURRENT MAJOR DEPRESSIVE DISORDER, IN FULL REMISSION (HCC): ICD-10-CM

## 2019-07-18 DIAGNOSIS — G40.909 SEIZURE DISORDER (HCC): ICD-10-CM

## 2019-07-18 DIAGNOSIS — F43.10 PTSD (POST-TRAUMATIC STRESS DISORDER): ICD-10-CM

## 2019-07-18 DIAGNOSIS — I82.5Z2 CHRONIC DEEP VEIN THROMBOSIS (DVT) OF DISTAL VEIN OF LEFT LOWER EXTREMITY (HCC): ICD-10-CM

## 2019-07-18 PROBLEM — R05.9 COUGH: Status: RESOLVED | Noted: 2019-03-05 | Resolved: 2019-07-18

## 2019-07-18 PROBLEM — E53.8 VITAMIN B12 DEFICIENCY: Status: RESOLVED | Noted: 2018-09-25 | Resolved: 2019-07-18

## 2019-07-18 PROCEDURE — 99214 OFFICE O/P EST MOD 30 MIN: CPT | Performed by: FAMILY MEDICINE

## 2019-07-18 RX ORDER — PRAZOSIN HYDROCHLORIDE 2 MG/1
CAPSULE ORAL
COMMUNITY
Start: 2019-06-27

## 2019-07-18 ASSESSMENT — PAIN SCALES - GENERAL: PAINLEVEL: NO PAIN

## 2019-07-18 NOTE — PROGRESS NOTES
CC: eye infection    HISTORY OF PRESENT ILLNESS: Patient is a 63 y.o. male established patient who presents today to discuss the following    Health Maintenance: Completed    Acute bacterial conjunctivitis of left eye  He was seen in urgent care about a week ago for unilateral conjunctivitis and swelling.  It started after working in [place] brush, within a few hours.  He denied foreign body sensation.  He was treated with a topical drop and oral antibiotics and has completed both.  He noted improvement but notes ongoing irritation.  He had nausea with doxycycline but finished.    The eyelid is still red but not painful, slightly irritated.  He denies eye discharge.    PTSD (post-traumatic stress disorder)  [facility].  He is on sertraline and prazosin there and valproic acid.      Seizure disorder (CMS-HCC)  He is following with Dr. Miguel every 6 months who prescribes the phenobarbital and dilantin.      Deep vein thrombosis (DVT) of distal vein of left lower extremity (CMS-HCC)  He is on coumadin for a history of DVT and hypercoagulable state.      Hyperlipidemia  Lab Results   Component Value Date/Time    CHOLSTRLTOT 198 01/16/2019 07:41 AM    CHOLSTRLTOT 200 (H) 01/08/2018 07:39 AM    CHOLSTRLTOT 207 (H) 12/30/2016 07:26 AM     (H) 01/16/2019 07:41 AM     (H) 01/08/2018 07:39 AM     (H) 12/30/2016 07:26 AM    HDL 35 (A) 01/16/2019 07:41 AM    HDL 33 (L) 01/08/2018 07:39 AM    HDL 31 (L) 12/30/2016 07:26 AM    TRIGLYCERIDE 251 (H) 01/16/2019 07:41 AM    TRIGLYCERIDE 210 (H) 01/08/2018 07:39 AM    TRIGLYCERIDE 221 (H) 12/30/2016 07:26 AM       The 10-year ASCVD risk score (Powellaristeo GARCIA Jr., et al., 2013) is: 10.6%    Values used to calculate the score:      Age: 63 years      Sex: Male      Is Non- : No      Diabetic: No      Tobacco smoker: No      Systolic Blood Pressure: 106 mmHg      Is BP treated: No      HDL  Cholesterol: 35 mg/dL      Total Cholesterol: 198 mg/dL      Elevated alkaline phosphatase level  He has an elevated alk phos in 1/2018, isoenzymes were normal in 1/2019    Recurrent major depressive disorder, in full remission ([facility])  [facility].  Symptoms are stable and he is tolerating the medications.      Patient Active Problem List    Diagnosis Date Noted   • Acute bacterial conjunctivitis of left eye 07/18/2019   • Hepatitis C antibody test positive 10/17/2018   • PTSD (post-traumatic stress disorder) 09/24/2018   • Recurrent major depressive disorder, in full remission ([facility]) 09/24/2018   • Elevated alkaline phosphatase level 07/09/2018   • Hyperlipidemia 06/22/2017   • Seizure disorder ([facility]) 12/22/2016   • Deep vein thrombosis (DVT) of distal vein of left lower extremity ([facility]) 12/22/2016   • Class 1 obesity in adult 12/22/2016   • Hypercoagulable state ([facility]) 12/22/2016      Allergies:Codeine; Demerol; Doxycycline; Penicillins; and Wasp venom    Current Outpatient Prescriptions   Medication Sig Dispense Refill   • PHENYTOIN PO Take 100 mg by mouth.     • phenobarbital (LUMINAL) 64.8 MG tablet Take 3 Tabs by mouth every day.     • valproic acid (DEPAKENE) 250 MG Cap Take 500 mg by mouth every bedtime.     • warfarin (COUMADIN) 10 MG Tab Take 1 Tab by mouth every day. Take one tablet daily as directed by  [facility] 90 Tab 0   • warfarin (COUMADIN) 2.5 MG Tab Take 1 Tab by mouth every Wednesday. 30 Tab 0   • cloNIDine (CATAPRES) 0.1 MG Tab TAKE 1 TAB BY MOUTH 3 TIMES A DAY AS NEEDED FOR ANGER/ANXIETY  0   • phenytoin ER (DILANTIN) 100 MG Cap Take 100 mg by mouth 3 times a day. Pt takes 5 Caps QOD alternating with 6 Caps as Directed     • prazosin (MINIPRESS) 2 MG Cap      • sertraline (ZOLOFT) 100 MG Tab Take 100 mg by mouth every day.       No current facility-administered medications for this visit.        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       - Respiratory: Negative for cough, sputum production, chest congestion, dyspnea, wheezing, and crackles.      - Cardiovascular: Negative for chest pain, palpitations, orthopnea, and bilateral lower extremity edema.       Exam:    /64 (BP Location: Right arm, Patient Position: Sitting, BP Cuff Size: Adult)   Pulse 64   Temp 36.3 °C (97.4 °F)   Resp 14   Ht 1.829 m (6')   Wt 111.5 kg (245 lb 12.8 oz)   SpO2 96%  Body mass index is 33.34 kg/m².    General:  Well nourished, well developed male in NAD  Head is grossly normal.  Eyes: left eyelid erythematous with mild swelling and skin slightly rough, no conjunctival injection or discharge  Neck: Supple without JVD or bruit. Thyroid is not enlarged.  Pulmonary: Clear to ausculation and percussion.  Normal effort. No rales, ronchi, or wheezing.  Cardiovascular: Regular rate and rhythm without murmur. Carotid and radial pulses are intact and equal bilaterally.  Extremities: no clubbing, cyanosis, or edema.    Please note that this dictation was created using voice recognition software. I have made every reasonable attempt to correct obvious errors, but I expect that there are errors of grammar and possibly content that I did not discover before finalizing the note.    Assessment/Plan:  1. Mixed hyperlipidemia  his 10 year cardiovascular risk is elevated at 10%.  The recommendations regarding treatment of elevated cholesterol were reviewed.  he does meet criteria for treatment at  this time with a statin.  The benefits and risks of statin therapy were discussed including decreasing risk by ~ 30% and increased risk of myalgia and to a lesser degree diabetes and dementia.  Lifestyle interventions including exercise and a diet low in trans and saturated fat and added cholesterol were reviewed.  His low HDL is likely contributing significantly so we will try lifestyle and retest in 3-6 months.  If his risk is still high would start a statin which he is agreeable with.    - Lipid Profile; Future  - Comp Metabolic Panel; Future    2. Elevated alkaline phosphatase level  This has resolved, will check yearly given his multiple medications.  - Comp Metabolic Panel; Future    3. Acute bacterial conjunctivitis of left eye  There is no evidence of residual infection.  There may be an ongoing hypersensitivity which should improve.  He may use benadryl, OTC allergy drops or OTC steroids on the eyelid for now if needed.  He agrees to contact the office for worsening redness, onset of discharge or pain    4. Chronic deep vein thrombosis (DVT) of distal vein of left lower extremity (HCC)  Continue chronic coumadin.    5. PTSD (post-traumatic stress disorder)  Stable, [facility]    6. Seizure disorder (HCC)  Continue management with Dr. Miguel.      7. Class 1 obesity without serious comorbidity with body mass index (BMI) of 30.0 to 30.9 in adult, unspecified obesity type  The increased risk for heart disease, diabetes and overall mortality were discussed with him.  General guidelines for weight loss including increasing fiber intake, limiting simple carbs and sweetened drinks, calorie restriction and increasing activity were reviewed.          8. Recurrent major depressive disorder, in full remission (HCC)  Stable, [facility],

## 2019-07-18 NOTE — ASSESSMENT & PLAN NOTE
Lab Results   Component Value Date/Time    CHOLSTRLTOT 198 01/16/2019 07:41 AM    CHOLSTRLTOT 200 (H) 01/08/2018 07:39 AM    CHOLSTRLTOT 207 (H) 12/30/2016 07:26 AM     (H) 01/16/2019 07:41 AM     (H) 01/08/2018 07:39 AM     (H) 12/30/2016 07:26 AM    HDL 35 (A) 01/16/2019 07:41 AM    HDL 33 (L) 01/08/2018 07:39 AM    HDL 31 (L) 12/30/2016 07:26 AM    TRIGLYCERIDE 251 (H) 01/16/2019 07:41 AM    TRIGLYCERIDE 210 (H) 01/08/2018 07:39 AM    TRIGLYCERIDE 221 (H) 12/30/2016 07:26 AM       The 10-year ASCVD risk score (Summer GARCIA Jr., et al., 2013) is: 10.6%    Values used to calculate the score:      Age: 63 years      Sex: Male      Is Non- : No      Diabetic: No      Tobacco smoker: No      Systolic Blood Pressure: 106 mmHg      Is BP treated: No      HDL Cholesterol: 35 mg/dL      Total Cholesterol: 198 mg/dL

## 2019-07-18 NOTE — ASSESSMENT & PLAN NOTE
[facility] and is doing well.  He is on sertraline and prazosin there and valproic acid.

## 2019-07-18 NOTE — PATIENT INSTRUCTIONS
Consider taking psyllium or metamucil twice daily for cholesterol    Cholesterol  Cholesterol is a white, waxy, fat-like substance needed by your body in small amounts. The liver makes all the cholesterol you need. Cholesterol is carried from the liver by the blood through the blood vessels. Deposits of cholesterol (plaque) may build up on blood vessel walls. These make the arteries narrower and stiffer. Cholesterol plaques increase the risk for heart attack and stroke.   You cannot feel your cholesterol level even if it is very high. The only way to know it is high is with a blood test. Once you know your cholesterol levels, you should keep a record of the test results. Work with your health care provider to keep your levels in the desired range.   WHAT DO THE RESULTS MEAN?  · Total cholesterol is a rough measure of all the cholesterol in your blood.    · LDL is the so-called bad cholesterol. This is the type that deposits cholesterol in the walls of the arteries. You want this level to be low.    · HDL is the good cholesterol because it cleans the arteries and carries the LDL away. You want this level to be high.  · Triglycerides are fat that the body can either burn for energy or store. High levels are closely linked to heart disease.    WHAT ARE THE DESIRED LEVELS OF CHOLESTEROL?  · Total cholesterol below 200.    · LDL below 100 for people at risk, below 70 for those at very high risk.    · HDL above 50 is good, above 60 is best.    · Triglycerides below 150.    HOW CAN I LOWER MY CHOLESTEROL?  · Diet. Follow your diet programs as directed by your health care provider.    ¨ Choose fish or white meat chicken and turkey, roasted or baked. Limit fatty cuts of red meat, fried foods, and processed meats, such as sausage and lunch meats.    ¨ Eat lots of fresh fruits and vegetables.  ¨ Choose whole grains, beans, pasta, potatoes, and cereals.    ¨ Use only small amounts of olive, corn, or canola oils.    ¨ Avoid  butter, mayonnaise, shortening, or palm kernel oils.  ¨ Avoid foods with trans fats.    ¨ Drink skim or nonfat milk and eat low-fat or nonfat yogurt and cheeses. Avoid whole milk, cream, ice cream, egg yolks, and full-fat cheeses.    ¨ Healthy desserts include te food cake, lorne snaps, animal crackers, hard candy, popsicles, and low-fat or nonfat frozen yogurt. Avoid pastries, cakes, pies, and cookies.    · Exercise. Follow your exercise programs as directed by your health care provider.    ¨ A regular program helps decrease LDL and raise HDL.    ¨ A regular program helps with weight control.    ¨ Do things that increase your activity level like gardening, walking, or taking the stairs. Ask your health care provider about how you can be more active in your daily life.    · Medicine. Take medicine only as directed by your health care provider.    ¨ Medicine may be prescribed by your health care provider to help lower cholesterol and decrease the risk for heart disease.    ¨ If you have several risk factors, you may need medicine even if your levels are normal.     This information is not intended to replace advice given to you by your health care provider. Make sure you discuss any questions you have with your health care provider.     Document Released: 09/12/2002 Document Revised: 01/08/2016 Document Reviewed: 10/01/2014  [employer] Interactive Patient Education ©2016 [employer]

## 2019-07-18 NOTE — ASSESSMENT & PLAN NOTE
He was seen in urgent care about a week ago for unilateral conjunctivitis and swelling.  It started after working in [employer], within a few hours.  He denied foreign body sensation.  He was treated with a topical drop and oral antibiotics and has completed both.  He noted improvement but notes ongoing irritation.  He had nausea with doxycycline but finished.    The eyelid is still red but not painful, slightly irritated.  He denies eye discharge.

## 2019-07-18 NOTE — ASSESSMENT & PLAN NOTE
[facility]  Symptoms are stable and he is tolerating the medications.

## 2019-07-20 ENCOUNTER — OFFICE VISIT (OUTPATIENT)
Dept: URGENT CARE | Facility: CLINIC | Age: 64
End: 2019-07-20
Payer: MEDICARE

## 2019-07-20 VITALS
HEIGHT: 72 IN | SYSTOLIC BLOOD PRESSURE: 102 MMHG | TEMPERATURE: 97.2 F | WEIGHT: 245 LBS | BODY MASS INDEX: 33.18 KG/M2 | OXYGEN SATURATION: 96 % | HEART RATE: 60 BPM | RESPIRATION RATE: 16 BRPM | DIASTOLIC BLOOD PRESSURE: 70 MMHG

## 2019-07-20 DIAGNOSIS — Z51.81 ENCOUNTER FOR MONITORING COUMADIN THERAPY: ICD-10-CM

## 2019-07-20 DIAGNOSIS — L03.213 PERIORBITAL CELLULITIS, UNSPECIFIED LATERALITY: ICD-10-CM

## 2019-07-20 DIAGNOSIS — Z79.01 ENCOUNTER FOR MONITORING COUMADIN THERAPY: ICD-10-CM

## 2019-07-20 PROCEDURE — 99214 OFFICE O/P EST MOD 30 MIN: CPT | Performed by: NURSE PRACTITIONER

## 2019-07-20 RX ORDER — CEPHALEXIN 500 MG/1
500 CAPSULE ORAL 4 TIMES DAILY
Qty: 20 CAP | Refills: 0 | Status: SHIPPED | OUTPATIENT
Start: 2019-07-20 | End: 2019-07-25

## 2019-07-20 RX ORDER — SULFAMETHOXAZOLE AND TRIMETHOPRIM 800; 160 MG/1; MG/1
1 TABLET ORAL 2 TIMES DAILY
Qty: 14 TAB | Refills: 0 | Status: SHIPPED | OUTPATIENT
Start: 2019-07-20 | End: 2019-07-20

## 2019-07-20 ASSESSMENT — ENCOUNTER SYMPTOMS
WHEEZING: 0
VOMITING: 0
PALPITATIONS: 0
EYE DISCHARGE: 0
EYE REDNESS: 0
DOUBLE VISION: 0
NAUSEA: 0
FEVER: 0
EYE PAIN: 0
BLURRED VISION: 0
CHILLS: 0
SHORTNESS OF BREATH: 0
PHOTOPHOBIA: 0

## 2019-07-20 NOTE — PROGRESS NOTES
Subjective:   Pierre Saravia is a 63 y.o. male who presents for Eye Problem (bilateral swelling)        HPI   Patient with recurrent bilateral periorbital swelling and redness that started a week ago. States he was seen in urgent care and given oral antibiotics and eye drops, which seemed to help initially, but has now returned. Denies fever, chills, or changes to vision or eye discharge. States the doxycycline he took prior upset his stomach too much. Denies pain. States area is itchy and flaking. Denies alleviating or aggravating factors.    Review of Systems   Constitutional: Negative for chills and fever.   Eyes: Negative for blurred vision, double vision, photophobia, pain, discharge and redness.   Respiratory: Negative for shortness of breath and wheezing.    Cardiovascular: Negative for chest pain and palpitations.   Gastrointestinal: Negative for nausea and vomiting.   Skin: Positive for itching and rash.     PMH:  has a past medical history of DVT (deep venous thrombosis) ([facility]); Electrocution (1984); and Epilepsy ([facility]).  MEDS:   Current Outpatient Prescriptions:   •  cephALEXin (KEFLEX) 500 MG Cap, Take 1 Cap by mouth 4 times a day for 5 days., Disp: 20 Cap, Rfl: 0  •  prazosin (MINIPRESS) 2 MG Cap, , Disp: , Rfl:   •  sertraline (ZOLOFT) 100 MG Tab, Take 100 mg by mouth every day., Disp: , Rfl:   •  phenobarbital (LUMINAL) 64.8 MG tablet, Take 3 Tabs by mouth every day., Disp: , Rfl:   •  valproic acid (DEPAKENE) 250 MG Cap, Take 500 mg by mouth every bedtime., Disp: , Rfl:   •  warfarin (COUMADIN) 10 MG Tab, Take 1 Tab by mouth every day. Take one tablet daily as directed by  [facility], Disp: 90 Tab, Rfl: 0  •  warfarin (COUMADIN) 2.5 MG Tab, Take 1 Tab by mouth every Wednesday., Disp: 30 Tab, Rfl: 0  •  cloNIDine (CATAPRES) 0.1 MG Tab, TAKE 1 TAB BY MOUTH 3 TIMES A DAY AS NEEDED FOR ANGER/ANXIETY, Disp: , Rfl: 0  •  phenytoin ER (DILANTIN) 100 MG Cap, Take 100 mg by mouth 3  times a day. Pt takes 5 Caps QOD alternating with 6 Caps as Directed, Disp: , Rfl:   ALLERGIES:   Allergies   Allergen Reactions   • Codeine Hives   • Demerol Hives   • Doxycycline      Nausea     • Penicillins Rash   • Wasp Venom Swelling     SURGHX:   Past Surgical History:   Procedure Laterality Date   • APPENDECTOMY       SOCHX:  reports that he quit smoking about 39 years ago. He quit after 10.00 years of use. He has never used smokeless tobacco. He reports that he does not drink alcohol or use drugs.  FH: Family history was reviewed, no pertinent findings to report       Objective:   /70 (BP Location: Left arm, Patient Position: Sitting)   Pulse 60   Temp 36.2 °C (97.2 °F)   Resp 16   Ht 1.829 m (6')   Wt 111.1 kg (245 lb)   SpO2 96%   BMI 33.23 kg/m²   Physical Exam   Constitutional: He is oriented to person, place, and time. He appears well-developed and well-nourished. No distress.   HENT:   Head: Normocephalic.       Right Ear: Hearing, tympanic membrane and ear canal normal. Tympanic membrane is not erythematous. No middle ear effusion.   Left Ear: Hearing, tympanic membrane and ear canal normal. Tympanic membrane is not erythematous.  No middle ear effusion.   Nose: No rhinorrhea. Right sinus exhibits no maxillary sinus tenderness and no frontal sinus tenderness. Left sinus exhibits no maxillary sinus tenderness and no frontal sinus tenderness.   Mouth/Throat: Uvula is midline, oropharynx is clear and moist and mucous membranes are normal. No posterior oropharyngeal erythema.   Eyes: Pupils are equal, round, and reactive to light. Conjunctivae and EOM are normal. Right eye exhibits no discharge, no exudate and no hordeolum. No foreign body present in the right eye. Left eye exhibits no discharge, no exudate and no hordeolum. No foreign body present in the left eye.   Mild periorbital upper eyelid swelling   Neck: Normal range of motion. No thyromegaly present.   Cardiovascular: Normal rate,  regular rhythm and normal heart sounds.    Pulmonary/Chest: Effort normal and breath sounds normal. No respiratory distress. He has no wheezes.   Lymphadenopathy:        Head (right side): No submandibular and no tonsillar adenopathy present.        Head (left side): No submandibular and no tonsillar adenopathy present.   Neurological: He is alert and oriented to person, place, and time.   Skin: Skin is warm and dry. He is not diaphoretic. There is erythema.   Erythema, warmth and swelling surrounding the eyes. No discharge or area of fluctuance noted.   Psychiatric: He has a normal mood and affect. His behavior is normal. Judgment and thought content normal.   Vitals reviewed.        Assessment/Plan:   Assessment    1. Periorbital cellulitis, unspecified laterality  - cephALEXin (KEFLEX) 500 MG Cap; Take 1 Cap by mouth 4 times a day for 5 days.  Dispense: 20 Cap; Refill: 0    2. Encounter for monitoring Coumadin therapy  - Prothrombin Time; Future    Would like to prescribe oral steroid, however, interaction with Dilantin. Advised to apply cool compresses to face PRN and continue with OTC Benadryl as prior to aid with itching. If symptoms do not resolve, advised to follow up with Opthalmology.    Since patient on Coumadin, ordered INR recheck to be completed in 3-4 days since antibiotic can increase effects.    Differential diagnosis, natural history, supportive care, and indications for immediate follow-up discussed.     **Please note that all invasive procedures during this visit were performed by myself and/or the Medical Assistant under the supervision of the PA or MD in office**

## 2019-07-22 ENCOUNTER — TELEPHONE (OUTPATIENT)
Dept: MEDICAL GROUP | Facility: PHYSICIAN GROUP | Age: 64
End: 2019-07-22

## 2019-07-22 DIAGNOSIS — D68.59 HYPERCOAGULABLE STATE (HCC): ICD-10-CM

## 2019-07-22 NOTE — TELEPHONE ENCOUNTER
Patient called voicemail did not state what the patient needed. Returned call and LVM for the patient to return our call.

## 2019-07-22 NOTE — TELEPHONE ENCOUNTER
Patient called back and would like blood work to be orders he is concerned that with him being on cephALEXin (KEFLEX) 500 MG Cap is will thin out his blood to much. There is a CMP and Lipid already ordered.would you like to order mor labs for him. Please advise. Patient will come in on Wednesday to have them done.

## 2019-07-23 NOTE — TELEPHONE ENCOUNTER
I have ordered an INR that he can have done when convenient, the medication can thin the blood, usually we just need to watch the INR more closely.  He can also try to get in with Patsy tomorrow instead.

## 2019-07-24 ENCOUNTER — HOSPITAL ENCOUNTER (OUTPATIENT)
Dept: LAB | Facility: MEDICAL CENTER | Age: 64
End: 2019-07-24
Attending: FAMILY MEDICINE
Payer: MEDICARE

## 2019-07-24 DIAGNOSIS — D68.59 HYPERCOAGULABLE STATE (HCC): ICD-10-CM

## 2019-07-24 DIAGNOSIS — E78.2 MIXED HYPERLIPIDEMIA: ICD-10-CM

## 2019-07-24 DIAGNOSIS — R74.8 ELEVATED ALKALINE PHOSPHATASE LEVEL: ICD-10-CM

## 2019-07-24 LAB
ALBUMIN SERPL BCP-MCNC: 4.1 G/DL (ref 3.2–4.9)
ALBUMIN/GLOB SERPL: 1.5 G/DL
ALP SERPL-CCNC: 81 U/L (ref 30–99)
ALT SERPL-CCNC: 22 U/L (ref 2–50)
ANION GAP SERPL CALC-SCNC: 7 MMOL/L (ref 0–11.9)
AST SERPL-CCNC: 21 U/L (ref 12–45)
BILIRUB SERPL-MCNC: 0.3 MG/DL (ref 0.1–1.5)
BUN SERPL-MCNC: 20 MG/DL (ref 8–22)
CALCIUM SERPL-MCNC: 8.7 MG/DL (ref 8.5–10.5)
CHLORIDE SERPL-SCNC: 109 MMOL/L (ref 96–112)
CHOLEST SERPL-MCNC: 187 MG/DL (ref 100–199)
CO2 SERPL-SCNC: 24 MMOL/L (ref 20–33)
CREAT SERPL-MCNC: 0.79 MG/DL (ref 0.5–1.4)
FASTING STATUS PATIENT QL REPORTED: NORMAL
GLOBULIN SER CALC-MCNC: 2.8 G/DL (ref 1.9–3.5)
GLUCOSE SERPL-MCNC: 99 MG/DL (ref 65–99)
HDLC SERPL-MCNC: 29 MG/DL
INR PPP: 1.83 (ref 0.87–1.13)
LDLC SERPL CALC-MCNC: 111 MG/DL
POTASSIUM SERPL-SCNC: 4 MMOL/L (ref 3.6–5.5)
PROT SERPL-MCNC: 6.9 G/DL (ref 6–8.2)
PROTHROMBIN TIME: 21.7 SEC (ref 12–14.6)
SODIUM SERPL-SCNC: 140 MMOL/L (ref 135–145)
TRIGL SERPL-MCNC: 236 MG/DL (ref 0–149)

## 2019-07-24 PROCEDURE — 85610 PROTHROMBIN TIME: CPT

## 2019-07-24 PROCEDURE — 80061 LIPID PANEL: CPT

## 2019-07-24 PROCEDURE — 36415 COLL VENOUS BLD VENIPUNCTURE: CPT

## 2019-07-24 PROCEDURE — 80053 COMPREHEN METABOLIC PANEL: CPT

## 2019-07-25 DIAGNOSIS — D68.59 HYPERCOAGULABLE STATE (HCC): ICD-10-CM

## 2019-07-25 DIAGNOSIS — I82.4Z2 DEEP VEIN THROMBOSIS (DVT) OF DISTAL VEIN OF LEFT LOWER EXTREMITY, UNSPECIFIED CHRONICITY (HCC): ICD-10-CM

## 2019-07-25 RX ORDER — WARFARIN SODIUM 10 MG/1
10 TABLET ORAL DAILY
Qty: 90 TAB | Refills: 0 | Status: SHIPPED | OUTPATIENT
Start: 2019-07-25 | End: 2019-08-14 | Stop reason: SDUPTHER

## 2019-07-31 ENCOUNTER — ANTICOAGULATION VISIT (OUTPATIENT)
Dept: MEDICAL GROUP | Facility: PHYSICIAN GROUP | Age: 64
End: 2019-07-31
Payer: MEDICARE

## 2019-07-31 VITALS
BODY MASS INDEX: 31.46 KG/M2 | DIASTOLIC BLOOD PRESSURE: 64 MMHG | OXYGEN SATURATION: 94 % | HEART RATE: 81 BPM | SYSTOLIC BLOOD PRESSURE: 100 MMHG | HEIGHT: 74 IN

## 2019-07-31 DIAGNOSIS — I82.5Z2 CHRONIC DEEP VEIN THROMBOSIS (DVT) OF DISTAL VEIN OF LEFT LOWER EXTREMITY (HCC): ICD-10-CM

## 2019-07-31 DIAGNOSIS — D68.59 HYPERCOAGULABLE STATE (HCC): ICD-10-CM

## 2019-07-31 DIAGNOSIS — G40.909 SEIZURE DISORDER (HCC): ICD-10-CM

## 2019-07-31 LAB — INR PPP: 1.8 (ref 2–3.5)

## 2019-07-31 PROCEDURE — 99211 OFF/OP EST MAY X REQ PHY/QHP: CPT | Performed by: FAMILY MEDICINE

## 2019-07-31 PROCEDURE — 85610 PROTHROMBIN TIME: CPT | Performed by: FAMILY MEDICINE

## 2019-07-31 NOTE — PROGRESS NOTES
Anticoagulation Summary  As of 2019    INR goal:   2.0-3.0   TTR:   78.2 % (1.2 y)   INR used for dosin.80! (2019)   Warfarin maintenance plan:   10 mg (10 mg x 1) every day   Weekly warfarin total:   70 mg   Plan last modified:   Patsy Collins (2019)   Next INR check:   2019   Target end date:   Indefinite    Indications    Deep vein thrombosis (DVT) of distal vein of left lower extremity (HCC) [I82.4Z2]  Hypercoagulable state (HCC) [D68.59]  Seizure disorder (HCC) [G40.909]             Anticoagulation Episode Summary     INR check location:   Anticoagulation Clinic    Preferred lab:       Send INR reminders to:       Comments:         Anticoagulation Care Providers     Provider Role Specialty Phone number    Shari Vargas M.D. Referring Family Medicine [phone]    [facility] Responsible  [phone]    Patsy Collins, PharmD Responsible          Anticoagulation Patient Findings  Patient Findings     Positives:   Change in medications    Negatives:   Signs/symptoms of thrombosis, Signs/symptoms of bleeding, Laboratory test error suspected, Change in health, Change in alcohol use, Change in activity, Upcoming invasive procedure, Emergency department visit, Upcoming dental procedure, Missed doses, Extra doses, Change in diet/appetite, Hospital admission, Bruising, Other complaints            History of Present Illness: follow up appointment for chronic anticoagulation with the high risk medication, warfarin for DVT/HYPERCOAGUABLE STATE    Last INR was out of range, dosage adjusted: pt remains sub therapeutic today.  No changes in seizure medications.  Will increase weekly dose by 7%.  Follow up in 2 weeks, to reduce the risk of adverse events related to this high risk medication, warfarin.    Patsy Collins, Clinical Pharmacist

## 2019-08-14 ENCOUNTER — ANTICOAGULATION VISIT (OUTPATIENT)
Dept: MEDICAL GROUP | Facility: PHYSICIAN GROUP | Age: 64
End: 2019-08-14
Payer: MEDICARE

## 2019-08-14 DIAGNOSIS — D68.59 HYPERCOAGULABLE STATE (HCC): ICD-10-CM

## 2019-08-14 DIAGNOSIS — I82.4Z2 DEEP VEIN THROMBOSIS (DVT) OF DISTAL VEIN OF LEFT LOWER EXTREMITY, UNSPECIFIED CHRONICITY (HCC): ICD-10-CM

## 2019-08-14 DIAGNOSIS — G40.909 SEIZURE DISORDER (HCC): ICD-10-CM

## 2019-08-14 DIAGNOSIS — I82.5Z2 CHRONIC DEEP VEIN THROMBOSIS (DVT) OF DISTAL VEIN OF LEFT LOWER EXTREMITY (HCC): ICD-10-CM

## 2019-08-14 LAB — INR PPP: 2.5 (ref 2–3.5)

## 2019-08-14 RX ORDER — WARFARIN SODIUM 10 MG/1
10 TABLET ORAL DAILY
Qty: 90 TAB | Refills: 0 | Status: SHIPPED | OUTPATIENT
Start: 2019-08-14

## 2019-08-14 NOTE — PROGRESS NOTES
Anticoagulation Summary  As of 2019    INR goal:   2.0-3.0   TTR:   78.0 % (1.2 y)   INR used for dosin.50 (2019)   Warfarin maintenance plan:   10 mg (10 mg x 1) every day   Weekly warfarin total:   70 mg   Plan last modified:   Patsy Collins (2019)   Next INR check:   2019   Target end date:   Indefinite    Indications    Deep vein thrombosis (DVT) of distal vein of left lower extremity (HCC) [I82.4Z2]  Hypercoagulable state (HCC) [D68.59]  Seizure disorder (HCC) [G40.909]             Anticoagulation Episode Summary     INR check location:   Anticoagulation Clinic    Preferred lab:       Send INR reminders to:       Comments:         Anticoagulation Care Providers     Provider Role Specialty Phone number    Shari Vargas M.D. Referring Family Medicine [phone]    [facility] Responsible  [phone]    Patsy Collins, PharmD Responsible          Anticoagulation Patient Findings        History of Present Illness: follow up appointment for chronic anticoagulation with the high risk medication, warfarin for DVT    Last INR was out of range, dosage adjusted: pt is now at goal, will increase the interval to recheck INR. Continue current dosing regimen.  Follow up in 4 weeks, to reduce the risk of adverse events related to this high risk medication, warfarin.    Patsy Collins, Clinical Pharmacist    Pt declines vitals today

## 2019-09-11 ENCOUNTER — ANTICOAGULATION VISIT (OUTPATIENT)
Dept: MEDICAL GROUP | Facility: PHYSICIAN GROUP | Age: 64
End: 2019-09-11
Payer: MEDICARE

## 2019-09-11 VITALS — DIASTOLIC BLOOD PRESSURE: 58 MMHG | SYSTOLIC BLOOD PRESSURE: 100 MMHG | OXYGEN SATURATION: 96 % | HEART RATE: 71 BPM

## 2019-09-11 DIAGNOSIS — G40.909 SEIZURE DISORDER (HCC): ICD-10-CM

## 2019-09-11 DIAGNOSIS — D68.59 HYPERCOAGULABLE STATE (HCC): ICD-10-CM

## 2019-09-11 LAB — INR PPP: 2.7 (ref 2–3.5)

## 2019-09-11 PROCEDURE — 85610 PROTHROMBIN TIME: CPT | Performed by: FAMILY MEDICINE

## 2019-09-11 PROCEDURE — 93793 ANTICOAG MGMT PT WARFARIN: CPT | Performed by: FAMILY MEDICINE

## 2019-09-11 NOTE — PROGRESS NOTES
Anticoagulation Summary  As of 2019    INR goal:   2.0-3.0   TTR:   79.3 % (1.3 y)   INR used for dosin.70 (2019)   Warfarin maintenance plan:   10 mg (10 mg x 1) every day   Weekly warfarin total:   70 mg   Plan last modified:   Patsy Collins (2019)   Next INR check:      Target end date:   Indefinite    Indications    Deep vein thrombosis (DVT) of distal vein of left lower extremity (HCC) [I82.4Z2]  Hypercoagulable state (HCC) [D68.59]  Seizure disorder (HCC) [G40.909]             Anticoagulation Episode Summary     INR check location:   Anticoagulation Clinic    Preferred lab:       Send INR reminders to:       Comments:         Anticoagulation Care Providers     Provider Role Specialty Phone number    Shari Vargas M.D. Referring Family Medicine [phone]    [facility] Responsible  [phone]    Patsy Collins, PharmD Responsible          Anticoagulation Patient Findings    History of Present Illness: follow up appointment for chronic anticoagulation with the high risk medication, warfarin for DVT.     Pt remains therapeutic.     Continue current dosing regimen.    Follow up in 6 weeks, to reduce the risk of adverse events related to this high risk medication, warfarin.    Bryce Eric, Pharmacy Intern    Patsy Collins, Clinical Pharmacist

## 2019-09-17 ENCOUNTER — OFFICE VISIT (OUTPATIENT)
Dept: MEDICAL GROUP | Facility: PHYSICIAN GROUP | Age: 64
End: 2019-09-17
Payer: MEDICARE

## 2019-09-17 ENCOUNTER — ANTICOAGULATION MONITORING (OUTPATIENT)
Dept: VASCULAR LAB | Facility: MEDICAL CENTER | Age: 64
End: 2019-09-17

## 2019-09-17 VITALS
SYSTOLIC BLOOD PRESSURE: 110 MMHG | TEMPERATURE: 96 F | HEIGHT: 72 IN | WEIGHT: 242 LBS | DIASTOLIC BLOOD PRESSURE: 70 MMHG | HEART RATE: 68 BPM | BODY MASS INDEX: 32.78 KG/M2 | OXYGEN SATURATION: 96 %

## 2019-09-17 DIAGNOSIS — G40.909 SEIZURE DISORDER (HCC): ICD-10-CM

## 2019-09-17 DIAGNOSIS — H10.12 ACUTE ATOPIC CONJUNCTIVITIS OF LEFT EYE: ICD-10-CM

## 2019-09-17 DIAGNOSIS — L03.213 PRESEPTAL CELLULITIS OF LEFT EYE: ICD-10-CM

## 2019-09-17 DIAGNOSIS — Z23 NEED FOR VACCINATION: ICD-10-CM

## 2019-09-17 DIAGNOSIS — D68.59 HYPERCOAGULABLE STATE (HCC): ICD-10-CM

## 2019-09-17 PROCEDURE — 99214 OFFICE O/P EST MOD 30 MIN: CPT | Mod: 25 | Performed by: FAMILY MEDICINE

## 2019-09-17 PROCEDURE — G0008 ADMIN INFLUENZA VIRUS VAC: HCPCS | Performed by: FAMILY MEDICINE

## 2019-09-17 PROCEDURE — 90662 IIV NO PRSV INCREASED AG IM: CPT | Performed by: FAMILY MEDICINE

## 2019-09-17 RX ORDER — SULFAMETHOXAZOLE AND TRIMETHOPRIM 800; 160 MG/1; MG/1
1 TABLET ORAL 2 TIMES DAILY
Qty: 14 TAB | Refills: 0 | Status: SHIPPED | OUTPATIENT
Start: 2019-09-17

## 2019-09-17 RX ORDER — DIPHENHYDRAMINE HCL 25 MG
25 TABLET ORAL EVERY 6 HOURS PRN
Qty: 30 TAB | Refills: 0 | COMMUNITY
Start: 2019-09-17

## 2019-09-17 RX ORDER — KETOTIFEN FUMARATE 0.25 MG/ML
1 SOLUTION/ DROPS OPHTHALMIC 2 TIMES DAILY
Qty: 10 ML | Refills: 0 | Status: SHIPPED | OUTPATIENT
Start: 2019-09-17

## 2019-09-17 RX ORDER — CEFDINIR 300 MG/1
300 CAPSULE ORAL 2 TIMES DAILY
Qty: 14 CAP | Refills: 0 | Status: SHIPPED | OUTPATIENT
Start: 2019-09-17

## 2019-09-17 ASSESSMENT — PATIENT HEALTH QUESTIONNAIRE - PHQ9
SUM OF ALL RESPONSES TO PHQ9 QUESTIONS 1 AND 2: 0
9. THOUGHTS THAT YOU WOULD BE BETTER OFF DEAD, OR OF HURTING YOURSELF: NOT AT ALL
4. FEELING TIRED OR HAVING LITTLE ENERGY: NOT AT ALL
6. FEELING BAD ABOUT YOURSELF - OR THAT YOU ARE A FAILURE OR HAVE LET YOURSELF OR YOUR FAMILY DOWN: NOT AL ALL
7. TROUBLE CONCENTRATING ON THINGS, SUCH AS READING THE NEWSPAPER OR WATCHING TELEVISION: NOT AT ALL
SUM OF ALL RESPONSES TO PHQ QUESTIONS 1-9: 0
1. LITTLE INTEREST OR PLEASURE IN DOING THINGS: NOT AT ALL
2. FEELING DOWN, DEPRESSED, IRRITABLE, OR HOPELESS: NOT AT ALL
5. POOR APPETITE OR OVEREATING: NOT AT ALL
8. MOVING OR SPEAKING SO SLOWLY THAT OTHER PEOPLE COULD HAVE NOTICED. OR THE OPPOSITE, BEING SO FIGETY OR RESTLESS THAT YOU HAVE BEEN MOVING AROUND A LOT MORE THAN USUAL: NOT AT ALL
3. TROUBLE FALLING OR STAYING ASLEEP OR SLEEPING TOO MUCH: NOT AT ALL

## 2019-09-17 NOTE — ASSESSMENT & PLAN NOTE
Yesterday evening his eye started to swell after weed eating.  This morning the eye was swollen shut and has gradually been opening through the day.  The eye is itching and has a coarse film over the lid.  He denies systemic symptoms, breathing issues or rash.  He had similar symptoms 2 months ago after working in the yard.

## 2019-09-17 NOTE — PROGRESS NOTES
[facility]    Received warning pt started Bactrim.  Called pt and Left VM requesting pt to reduce warfarin to alternating 5 mg and 10 mg while on Bactrim (25% reduction).  Requested a call back to make sure he received these directions.     Requested pt to obtain next INR in 48 hours with venous draw.  Standing order approved so pt can follow up INR within 48 hours.    Zac Son, PharmD

## 2019-09-18 ENCOUNTER — ANTICOAGULATION VISIT (OUTPATIENT)
Dept: MEDICAL GROUP | Facility: PHYSICIAN GROUP | Age: 64
End: 2019-09-18
Payer: MEDICARE

## 2019-09-18 DIAGNOSIS — I82.4Z2 DEEP VEIN THROMBOSIS (DVT) OF DISTAL VEIN OF LEFT LOWER EXTREMITY, UNSPECIFIED CHRONICITY (HCC): ICD-10-CM

## 2019-09-18 DIAGNOSIS — G40.909 SEIZURE DISORDER (HCC): ICD-10-CM

## 2019-09-18 DIAGNOSIS — D68.59 HYPERCOAGULABLE STATE (HCC): ICD-10-CM

## 2019-09-18 LAB — INR PPP: 1.7 (ref 2–3.5)

## 2019-09-18 PROCEDURE — 99211 OFF/OP EST MAY X REQ PHY/QHP: CPT | Performed by: FAMILY MEDICINE

## 2019-09-18 PROCEDURE — 85610 PROTHROMBIN TIME: CPT | Performed by: FAMILY MEDICINE

## 2019-09-18 NOTE — PROGRESS NOTES
Anticoagulation Summary  As of 2019    INR goal:   2.0-3.0   TTR:   79.2 % (1.3 y)   INR used for dosin.70! (2019)   Warfarin maintenance plan:   10 mg (10 mg x 1) every day   Weekly warfarin total:   70 mg   Plan last modified:   Patsy Collins (2019)   Next INR check:      Target end date:   Indefinite    Indications    Deep vein thrombosis (DVT) of distal vein of left lower extremity (HCC) [I82.4Z2]  Hypercoagulable state (HCC) [D68.59]  Seizure disorder (HCC) [G40.909]             Anticoagulation Episode Summary     INR check location:   Anticoagulation Clinic    Preferred lab:       Send INR reminders to:       Comments:         Anticoagulation Care Providers     Provider Role Specialty Phone number    Shari Vargas M.D. Referring Family Medicine [phone]    [facility] Responsible  [phone]    Patsy Collins, PharmD Responsible          Anticoagulation Patient Findings  Patient Findings     Positives:   Missed doses, Change in medications    Negatives:   Signs/symptoms of thrombosis, Signs/symptoms of bleeding, Laboratory test error suspected, Change in health, Change in alcohol use, Change in activity, Upcoming invasive procedure, Emergency department visit, Upcoming dental procedure, Extra doses, Change in diet/appetite, Hospital admission, Bruising, Other complaints              History of Present Illness: follow up appointment for chronic anticoagulation with the high risk medication, warfarin for DVT    Last INR was out of range, dosage adjusted: pt is now sub therapeutic today.  The provider at  empirically reduced his dose yesterday, as he is starting omnicef and SMP/TMZ.  Will bolus dose with 15mg tonight, and continue 70mg/week. Follow up in 1 weeks, to reduce the risk of adverse events related to this high risk medication, warfarin.    Patsy Collins, Clinical Pharmacist  Pt declines vitals today

## 2019-09-24 NOTE — PROGRESS NOTES
CC: left eye infection    HISTORY OF PRESENT ILLNESS: Patient is a 64 y.o. male established patient who presents today to discuss the following new problem    Health Maintenance: Completed    Acute atopic conjunctivitis of left eye  Yesterday evening his eye started to swell after weed eating.  This morning the eye was swollen shut and has gradually been opening through the day.  The eye is itching and has a coarse film over the lid.  He denies systemic symptoms, breathing issues or rash.  He had similar symptoms 2 months ago after working in the yard.        Patient Active Problem List    Diagnosis Date Noted   • Acute atopic conjunctivitis of left eye 07/18/2019   • Hepatitis C antibody test positive 10/17/2018   • PTSD (post-traumatic stress disorder) 09/24/2018   • Recurrent major depressive disorder, in full remission ([facility]) 09/24/2018   • Elevated alkaline phosphatase level 07/09/2018   • Hyperlipidemia 06/22/2017   • Seizure disorder ([facility]) 12/22/2016   • Deep vein thrombosis (DVT) of distal vein of left lower extremity ([facility]) 12/22/2016   • Class 1 obesity in adult 12/22/2016   • Hypercoagulable state ([facility]) 12/22/2016      Allergies:Codeine; Demerol; Doxycycline; Penicillins; and Wasp venom    Current Outpatient Medications   Medication Sig Dispense Refill   • ketotifen (KP KETOTIFEN FUMARATE) 0.025 % ophthalmic solution Place 1 Drop in both eyes 2 times a day. 10 mL 0   • diphenhydrAMINE (BENADRYL ALLERGY) 25 MG Tab Take 1 tablet by mouth every 6 hours as needed for Sleep. 30 Tab 0   • sulfamethoxazole-trimethoprim (BACTRIM DS) 800-160 MG tablet Take 1 Tab by mouth 2 times a day. 14 Tab 0   • cefdinir (OMNICEF) 300 MG Cap Take 1 Cap by mouth 2 times a day. 14 Cap 0   • warfarin (COUMADIN) 10 MG Tab Take 1 Tab by mouth every day. Take one tablet daily as directed by  [facility] 90 Tab 0   • prazosin (MINIPRESS) 2 MG Cap      • sertraline (ZOLOFT) 100 MG Tab Take 100 mg by mouth every day.      • phenobarbital (LUMINAL) 64.8 MG tablet Take 3 Tabs by mouth every day.     • valproic acid (DEPAKENE) 250 MG Cap Take 500 mg by mouth every bedtime.     • cloNIDine (CATAPRES) 0.1 MG Tab TAKE 1 TAB BY MOUTH 3 TIMES A DAY AS NEEDED FOR ANGER/ANXIETY  0   • phenytoin ER (DILANTIN) 100 MG Cap Take 100 mg by mouth 3 times a day. Pt takes 5 Caps QOD alternating with 6 Caps as Directed       No current facility-administered medications for this visit.        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     - HEENT: Negative for headaches, vision changes, hearing changes, ear pain, ear discharge, rhinorrhea, sinus congestion, sore throat, and neck pain.         Exam:    /70 (BP Location: Right arm, Patient Position: Sitting, BP Cuff Size: Large adult)   Pulse 68   Temp (!) 35.6 °C (96 °F) (Temporal)   Ht 1.829 m (6')   Wt 109.8 kg (242 lb)   SpO2 96%  Body mass index is 32.82 kg/m².    General:  Well nourished, well developed male in NAD  Head is grossly normal.  HEENT: left eyelid erythematous and swollen but not warm or indurated, more edematous  Conjunctiva injected without hemorrhage or foreign body  EOMI intact, PERRLA          Assessment/Plan:  1. Acute atopic conjunctivitis of left eye  The rapidity of the onset and the exam suggest allergic conjunctivitis and eye irritation.  We will start with treatment of this.  This is a recurrent symptom  and occurs after yard work very rapidly which makes infection seem less likely. There is no foreign body.  Swelling is quite profound so antibiotics were given if this is not responding to alergy treatment.  - ketotifen (KP KETOTIFEN FUMARATE) 0.025 % ophthalmic solution; Place 1 Drop in both eyes 2 times a day.  Dispense: 10 mL; Refill: 0  - diphenhydrAMINE (BENADRYL ALLERGY) 25 MG Tab; Take 1 tablet by mouth every 6 hours as needed for Sleep.  Dispense: 30 Tab; Refill: 0    2. Need for vaccination  - INFLUENZA VACCINE, HIGH DOSE (65+ ONLY)    3. Preseptal cellulitis of left eye  If his pain or the redness spreads or worsens he should start antibiotics for a potential cellulitis.  He should follow up if this is not responding.    - sulfamethoxazole-trimethoprim (BACTRIM DS) 800-160 MG tablet; Take 1 Tab by mouth 2 times a day.  Dispense: 14 Tab; Refill: 0  - cefdinir (OMNICEF) 300 MG Cap; Take 1 Cap by mouth 2 times a day.  Dispense: 14 Cap; Refill: 0

## 2019-09-25 ENCOUNTER — ANTICOAGULATION VISIT (OUTPATIENT)
Dept: MEDICAL GROUP | Facility: PHYSICIAN GROUP | Age: 64
End: 2019-09-25
Payer: MEDICARE

## 2019-09-25 DIAGNOSIS — I82.4Z2 DEEP VEIN THROMBOSIS (DVT) OF DISTAL VEIN OF LEFT LOWER EXTREMITY, UNSPECIFIED CHRONICITY (HCC): ICD-10-CM

## 2019-09-25 DIAGNOSIS — G40.909 SEIZURE DISORDER (HCC): ICD-10-CM

## 2019-09-25 DIAGNOSIS — D68.59 HYPERCOAGULABLE STATE (HCC): ICD-10-CM

## 2019-09-25 LAB — INR PPP: 3 (ref 2–3.5)

## 2019-09-25 PROCEDURE — 85610 PROTHROMBIN TIME: CPT | Performed by: FAMILY MEDICINE

## 2019-09-25 PROCEDURE — 93793 ANTICOAG MGMT PT WARFARIN: CPT | Performed by: FAMILY MEDICINE

## 2019-09-25 NOTE — PROGRESS NOTES
Anticoagulation Summary  As of 9/25/2019    INR goal:   2.0-3.0   TTR:   79.1 % (1.3 y)   INR used for dosing:   3.00 (9/25/2019)   Warfarin maintenance plan:   10 mg (10 mg x 1) every day   Weekly warfarin total:   70 mg   Plan last modified:   Patsy Collins (9/18/2019)   Next INR check:   10/9/2019   Target end date:   Indefinite    Indications    Deep vein thrombosis (DVT) of distal vein of left lower extremity (HCC) [I82.4Z2]  Hypercoagulable state (HCC) [D68.59]  Seizure disorder (HCC) [G40.909]             Anticoagulation Episode Summary     INR check location:   Anticoagulation Clinic    Preferred lab:       Send INR reminders to:       Comments:         Anticoagulation Care Providers     Provider Role Specialty Phone number    Shari Vargas M.D. Referring Family Medicine [phone]    [facility] Responsible  [phone]    Patsy Collins, PharmD Responsible          Anticoagulation Patient Findings  Patient Findings     Negatives:   Signs/symptoms of thrombosis, Signs/symptoms of bleeding, Laboratory test error suspected, Change in health, Change in alcohol use, Change in activity, Upcoming invasive procedure, Emergency department visit, Upcoming dental procedure, Missed doses, Extra doses, Change in medications, Change in diet/appetite, Hospital admission, Bruising, Other complaints              History of Present Illness: follow up appointment for chronic anticoagulation with the high risk medication, warfarin for DVT    Last INR was out of range, dosage adjusted: pt is now at goal. Continue current dosing regimen.  Follow up in 2 weeks, to reduce the risk of adverse events related to this high risk medication, warfarin.  Pt declines vitals today    Patsy Collins Clinical Pharmacist

## 2019-10-16 ENCOUNTER — TELEPHONE (OUTPATIENT)
Dept: VASCULAR LAB | Facility: MEDICAL CENTER | Age: 64
End: 2019-10-16

## 2019-10-16 NOTE — TELEPHONE ENCOUNTER
Received a [facility] Toshia Linda RPH [facility] stating [facility].  INR was 1.0, they will get him back into the therapeutic range.  Patsy Collins, Clinical Pharmacist, CDE, CACP

## 2019-11-07 ENCOUNTER — TELEPHONE (OUTPATIENT)
Dept: VASCULAR LAB | Facility: MEDICAL CENTER | Age: 64
End: 2019-11-07

## 2019-11-07 NOTE — TELEPHONE ENCOUNTER
Pt no-showed his anticoagulation services appointment.  Patsy Collins, Clinical Pharmacist, CDE, CACP

## 2019-11-18 ENCOUNTER — TELEPHONE (OUTPATIENT)
Dept: MEDICAL GROUP | Facility: PHYSICIAN GROUP | Age: 64
End: 2019-11-18

## 2019-11-18 NOTE — TELEPHONE ENCOUNTER
Spoke with Pierre to reschedule missed anticoagulation services appointment.  Pt is hesitant to schedule.  Pt believes he is booked.  Offered lab draw as well. He will call back in am.  Patsy Collins, Clinical Pharmacist, CDE, CACP

## 2019-11-20 ENCOUNTER — ANTICOAGULATION VISIT (OUTPATIENT)
Dept: MEDICAL GROUP | Facility: PHYSICIAN GROUP | Age: 64
End: 2019-11-20
Payer: MEDICARE

## 2019-11-20 DIAGNOSIS — G40.909 SEIZURE DISORDER (HCC): ICD-10-CM

## 2019-11-20 DIAGNOSIS — I82.4Z2 DEEP VEIN THROMBOSIS (DVT) OF DISTAL VEIN OF LEFT LOWER EXTREMITY, UNSPECIFIED CHRONICITY (HCC): ICD-10-CM

## 2019-11-20 DIAGNOSIS — D68.59 HYPERCOAGULABLE STATE (HCC): ICD-10-CM

## 2019-11-20 LAB — INR PPP: 1.2 (ref 2–3.5)

## 2019-11-20 PROCEDURE — 99211 OFF/OP EST MAY X REQ PHY/QHP: CPT | Performed by: NURSE PRACTITIONER

## 2019-11-20 PROCEDURE — 99999 POCT PROTIME: CPT | Performed by: FAMILY MEDICINE

## 2019-11-20 NOTE — PROGRESS NOTES
Anticoagulation Summary  As of 2019    INR goal:   2.0-3.0   TTR:   76.7 % (1.5 y)   INR used for dosin.20! (2019)   Warfarin maintenance plan:   15 mg (10 mg x 1.5) every Fri; 10 mg (10 mg x 1) all other days   Weekly warfarin total:   75 mg   Plan last modified:   Jania Guerra PharmD (2019)   Next INR check:   2019   Target end date:   Indefinite    Indications    Deep vein thrombosis (DVT) of distal vein of left lower extremity (HCC) [I82.4Z2]  Hypercoagulable state (HCC) [D68.59]  Seizure disorder (HCC) [G40.909]             Anticoagulation Episode Summary     INR check location:   Anticoagulation Clinic    Preferred lab:       Send INR reminders to:       Comments:         Anticoagulation Care Providers     Provider Role Specialty Phone number    Shari Vargas M.D. Referring Family Medicine [phone]    [facility]tico[facility] Responsible  [phone]    Josselin IvoryD Responsible          Anticoagulation Patient Findings  Patient Findings     Positives:   Change in diet/appetite            HPI:   Pt seen in clinic today, on anticoagulation therapy with warfarin for DVT    Patient's previous INR was therapeutic at 3.0 on , at which time patient was instructed to continue regimen.  He returns to clinic today to recheck INR to ensure it is therapeutic and thus preventing possible clotting and/or bleeding/bruising complications.    Does patient have any changes to current medical/health status since last appt (Y/N):  no  Does patient have any signs/symptoms of bleeding and/or thrombosis since the last appt (Y/N):  no  Does patient have any interval changes to diet or medications since last appt (Y/N):  Yes - increased vit K intake  Are there any complications or cost restrictions with current therapy (Y/N):  no       Vitals declined    Asssessment:      INR subtherapeutic at 1.2   Reason(s) for out of range INR today:  Increased vit K intake       Plan:  Bolus x 1 day, then increase dosing regimen; last DVT was 3 years ago, so bridging unnecessary     Follow up:  Because warfarin is a high risk medication and current CHEST guidelines recommend regular monitoring intervals (few days up to 12 weeks), will have patient return to clinic in 1 weeks to recheck INR.    Pt's [facility] PCP is Shari Vargas M.D.    Referral due 05/20    Josselin MultaniD

## 2019-12-20 ENCOUNTER — TELEPHONE (OUTPATIENT)
Dept: VASCULAR LAB | Facility: MEDICAL CENTER | Age: 64
End: 2019-12-20

## 2019-12-20 ENCOUNTER — ANTICOAGULATION MONITORING (OUTPATIENT)
Dept: VASCULAR LAB | Facility: MEDICAL CENTER | Age: 64
End: 2019-12-20

## 2019-12-20 DIAGNOSIS — G40.909 SEIZURE DISORDER (HCC): ICD-10-CM

## 2019-12-20 DIAGNOSIS — D68.59 HYPERCOAGULABLE STATE (HCC): ICD-10-CM

## 2019-12-20 NOTE — TELEPHONE ENCOUNTER
Pt reports he is now managed by [facility].  Will dc from service.  Patsy Collins, Clinical Pharmacist, CDE, CACP

## 2019-12-20 NOTE — PROGRESS NOTES
Spoke with Pierre who reports that he is currently being managed by [facility]. Discharged from clinic.    Patsy Collins, Clinical Pharmacist, CDE, CACP

## 2020-05-11 ENCOUNTER — NON-PROVIDER VISIT (OUTPATIENT)
Dept: URGENT CARE | Facility: PHYSICIAN GROUP | Age: 65
End: 2020-05-11

## 2020-05-11 ENCOUNTER — APPOINTMENT (OUTPATIENT)
Dept: OCCUPATIONAL MEDICINE | Facility: CLINIC | Age: 65
End: 2020-05-11

## 2020-05-11 DIAGNOSIS — Z02.1 PRE-EMPLOYMENT DRUG SCREENING: ICD-10-CM

## 2020-05-11 LAB
AMP AMPHETAMINE: NORMAL
COC COCAINE: NORMAL
INT CON NEG: NEGATIVE
INT CON POS: POSITIVE
MET METHAMPHETAMINES: NORMAL
OPI OPIATES: NORMAL
PCP PHENCYCLIDINE: NORMAL
POC DRUG COMMENT 753798-OCCUPATIONAL HEALTH: NORMAL
THC: NORMAL

## 2020-05-11 PROCEDURE — 80305 DRUG TEST PRSMV DIR OPT OBS: CPT | Performed by: PHYSICIAN ASSISTANT

## 2021-09-07 NOTE — ASSESSMENT & PLAN NOTE
He served in the  in the past.  He was electrocuted and has epilepsy which are thought to be to the traumas.  He was never in active combat.  He is [facility] to [facility].  He reports avoiding situations and anger/anxiety.     Patient is outside the appropriate time window for IV Alteplase